# Patient Record
Sex: FEMALE | Race: BLACK OR AFRICAN AMERICAN | NOT HISPANIC OR LATINO
[De-identification: names, ages, dates, MRNs, and addresses within clinical notes are randomized per-mention and may not be internally consistent; named-entity substitution may affect disease eponyms.]

---

## 2017-10-17 ENCOUNTER — APPOINTMENT (OUTPATIENT)
Dept: SURGERY | Facility: CLINIC | Age: 67
End: 2017-10-17
Payer: MEDICARE

## 2017-10-17 VITALS
HEART RATE: 69 BPM | OXYGEN SATURATION: 97 % | BODY MASS INDEX: 26.43 KG/M2 | SYSTOLIC BLOOD PRESSURE: 154 MMHG | TEMPERATURE: 97.6 F | HEIGHT: 61 IN | DIASTOLIC BLOOD PRESSURE: 73 MMHG | WEIGHT: 140 LBS

## 2017-10-17 DIAGNOSIS — Z86.79 PERSONAL HISTORY OF OTHER DISEASES OF THE CIRCULATORY SYSTEM: ICD-10-CM

## 2017-10-17 DIAGNOSIS — Z82.5 FAMILY HISTORY OF ASTHMA AND OTHER CHRONIC LOWER RESPIRATORY DISEASES: ICD-10-CM

## 2017-10-17 DIAGNOSIS — Z85.3 PERSONAL HISTORY OF MALIGNANT NEOPLASM OF BREAST: ICD-10-CM

## 2017-10-17 DIAGNOSIS — Z86.69 PERSONAL HISTORY OF OTHER DISEASES OF THE NERVOUS SYSTEM AND SENSE ORGANS: ICD-10-CM

## 2017-10-17 DIAGNOSIS — Z86.39 PERSONAL HISTORY OF OTHER ENDOCRINE, NUTRITIONAL AND METABOLIC DISEASE: ICD-10-CM

## 2017-10-17 DIAGNOSIS — Z87.39 PERSONAL HISTORY OF OTHER DISEASES OF THE MUSCULOSKELETAL SYSTEM AND CONNECTIVE TISSUE: ICD-10-CM

## 2017-10-17 PROCEDURE — 99203 OFFICE O/P NEW LOW 30 MIN: CPT

## 2017-12-14 ENCOUNTER — LABORATORY RESULT (OUTPATIENT)
Age: 67
End: 2017-12-14

## 2017-12-14 ENCOUNTER — APPOINTMENT (OUTPATIENT)
Dept: PULMONOLOGY | Facility: CLINIC | Age: 67
End: 2017-12-14
Payer: MEDICARE

## 2017-12-14 ENCOUNTER — TRANSCRIPTION ENCOUNTER (OUTPATIENT)
Age: 67
End: 2017-12-14

## 2017-12-14 VITALS
OXYGEN SATURATION: 93 % | TEMPERATURE: 97.5 F | BODY MASS INDEX: 27.19 KG/M2 | HEIGHT: 61 IN | SYSTOLIC BLOOD PRESSURE: 110 MMHG | HEART RATE: 97 BPM | DIASTOLIC BLOOD PRESSURE: 70 MMHG | RESPIRATION RATE: 16 BRPM | WEIGHT: 144 LBS

## 2017-12-14 PROCEDURE — ZZZZZ: CPT

## 2017-12-14 PROCEDURE — 99215 OFFICE O/P EST HI 40 MIN: CPT | Mod: 25

## 2017-12-14 PROCEDURE — 94729 DIFFUSING CAPACITY: CPT

## 2017-12-14 PROCEDURE — 94726 PLETHYSMOGRAPHY LUNG VOLUMES: CPT

## 2017-12-14 PROCEDURE — 94060 EVALUATION OF WHEEZING: CPT

## 2017-12-14 PROCEDURE — 36415 COLL VENOUS BLD VENIPUNCTURE: CPT

## 2017-12-15 LAB
25(OH)D3 SERPL-MCNC: 35.2 NG/ML
ALBUMIN SERPL ELPH-MCNC: 3.9 G/DL
ALP BLD-CCNC: 67 U/L
ALT SERPL-CCNC: 41 U/L
ANION GAP SERPL CALC-SCNC: 17 MMOL/L
AST SERPL-CCNC: 36 U/L
B2 MICROGLOB SERPL-MCNC: 1.6 MG/L
BASOPHILS # BLD AUTO: 0.01 K/UL
BASOPHILS NFR BLD AUTO: 0.2 %
BILIRUB SERPL-MCNC: 0.2 MG/DL
BUN SERPL-MCNC: 17 MG/DL
CALCIUM SERPL-MCNC: 9.6 MG/DL
CALCIUM SERPL-MCNC: 9.6 MG/DL
CARDIOLIPIN AB SER IA-ACNC: NEGATIVE
CCP AB SER IA-ACNC: <8 UNITS
CHLORIDE SERPL-SCNC: 101 MMOL/L
CO2 SERPL-SCNC: 26 MMOL/L
CREAT SERPL-MCNC: 0.89 MG/DL
CRP SERPL-MCNC: <0.2 MG/DL
DEPRECATED KAPPA LC FREE/LAMBDA SER: 1.45 RATIO
ENA SCL70 IGG SER IA-ACNC: <0.2 AL
ENA SS-A AB SER IA-ACNC: <0.2 AL
ENA SS-B AB SER IA-ACNC: <0.2 AL
EOSINOPHIL # BLD AUTO: 0.08 K/UL
EOSINOPHIL NFR BLD AUTO: 1.8 %
ERYTHROCYTE [SEDIMENTATION RATE] IN BLOOD BY WESTERGREN METHOD: 12 MM/HR
FOLATE RBC-MCNC: 1100 NG/ML
GLUCOSE SERPL-MCNC: 109 MG/DL
HCT VFR BLD CALC: 44.3 %
HCT VFR BLD CALC: 45 %
HCYS SERPL-MCNC: 6.7 UMOL/L
HGB BLD-MCNC: 14.3 G/DL
IGA SER QL IEP: 187 MG/DL
IGG SER QL IEP: 1700 MG/DL
IGM SER QL IEP: 48 MG/DL
IMM GRANULOCYTES NFR BLD AUTO: 0 %
KAPPA LC CSF-MCNC: 1.28 MG/DL
KAPPA LC SERPL-MCNC: 1.85 MG/DL
LYMPHOCYTES # BLD AUTO: 1.58 K/UL
LYMPHOCYTES NFR BLD AUTO: 36.4 %
MAN DIFF?: NORMAL
MCHC RBC-ENTMCNC: 29.4 PG
MCHC RBC-ENTMCNC: 32.3 GM/DL
MCV RBC AUTO: 91.2 FL
MONOCYTES # BLD AUTO: 0.28 K/UL
MONOCYTES NFR BLD AUTO: 6.5 %
NEUTROPHILS # BLD AUTO: 2.39 K/UL
NEUTROPHILS NFR BLD AUTO: 55.1 %
NT-PROBNP SERPL-MCNC: 83 PG/ML
PARATHYROID HORMONE INTACT: 25 PG/ML
PHOSPHATE SERPL-MCNC: 2.8 MG/DL
PLATELET # BLD AUTO: 214 K/UL
POTASSIUM SERPL-SCNC: 4 MMOL/L
PROT SERPL-MCNC: 8 G/DL
RBC # BLD: 4.86 M/UL
RBC # FLD: 15 %
RF+CCP IGG SER-IMP: NEGATIVE
RHEUMATOID FACT SER QL: <7 IU/ML
SODIUM SERPL-SCNC: 144 MMOL/L
T3FREE SERPL-MCNC: 2.45 PG/ML
T4 FREE SERPL-MCNC: 1.3 NG/DL
TSH SERPL-ACNC: 0.82 UIU/ML
URATE SERPL-MCNC: 4.5 MG/DL
VIT B12 SERPL-MCNC: 1560 PG/ML
WBC # FLD AUTO: 4.34 K/UL

## 2017-12-18 LAB
ADJUSTED MITOGEN: 8.33 IU/ML
ADJUSTED TB AG: 0 IU/ML
ANACR T: NEGATIVE
M TB IFN-G BLD-IMP: NEGATIVE
MPO AB + PR3 PNL SER: NORMAL
QUANTIFERON GOLD NIL: 0.02 IU/ML
TOTAL IGE SMQN RAST: 86 KU/L

## 2017-12-20 LAB — T4 FREE SERPL DIALY-MCNC: 0.97 NG/DL

## 2017-12-22 LAB — COLLAGEN CTX SERPL-MCNC: 170 PG/ML

## 2018-02-22 ENCOUNTER — OUTPATIENT (OUTPATIENT)
Dept: OUTPATIENT SERVICES | Facility: HOSPITAL | Age: 68
LOS: 1 days | End: 2018-02-22
Payer: COMMERCIAL

## 2018-02-22 ENCOUNTER — APPOINTMENT (OUTPATIENT)
Dept: SLEEP CENTER | Facility: CLINIC | Age: 68
End: 2018-02-22
Payer: MEDICARE

## 2018-02-22 PROCEDURE — G0399: CPT | Mod: 26

## 2018-02-22 PROCEDURE — G0399: CPT

## 2018-02-26 ENCOUNTER — RESULT REVIEW (OUTPATIENT)
Age: 68
End: 2018-02-26

## 2018-02-28 DIAGNOSIS — G47.33 OBSTRUCTIVE SLEEP APNEA (ADULT) (PEDIATRIC): ICD-10-CM

## 2018-03-07 ENCOUNTER — APPOINTMENT (OUTPATIENT)
Dept: THORACIC SURGERY | Facility: CLINIC | Age: 68
End: 2018-03-07

## 2018-03-08 ENCOUNTER — APPOINTMENT (OUTPATIENT)
Dept: PULMONOLOGY | Facility: CLINIC | Age: 68
End: 2018-03-08
Payer: MEDICARE

## 2018-03-08 ENCOUNTER — APPOINTMENT (OUTPATIENT)
Dept: PULMONOLOGY | Facility: CLINIC | Age: 68
End: 2018-03-08

## 2018-03-08 VITALS
RESPIRATION RATE: 18 BRPM | SYSTOLIC BLOOD PRESSURE: 140 MMHG | TEMPERATURE: 97.4 F | DIASTOLIC BLOOD PRESSURE: 90 MMHG | BODY MASS INDEX: 27.19 KG/M2 | OXYGEN SATURATION: 97 % | WEIGHT: 144 LBS | HEIGHT: 61 IN | HEART RATE: 90 BPM

## 2018-03-08 DIAGNOSIS — C50.912 MALIGNANT NEOPLASM OF UNSPECIFIED SITE OF LEFT FEMALE BREAST: ICD-10-CM

## 2018-03-08 PROCEDURE — 36415 COLL VENOUS BLD VENIPUNCTURE: CPT

## 2018-03-08 PROCEDURE — 99215 OFFICE O/P EST HI 40 MIN: CPT | Mod: 25

## 2018-03-09 LAB
CANCER AG125 SERPL-ACNC: 10 U/ML
CEA SERPL-MCNC: 2.2 NG/ML

## 2018-04-09 ENCOUNTER — APPOINTMENT (OUTPATIENT)
Dept: SURGERY | Facility: CLINIC | Age: 68
End: 2018-04-09
Payer: MEDICARE

## 2018-04-09 PROCEDURE — 99214 OFFICE O/P EST MOD 30 MIN: CPT

## 2018-04-18 ENCOUNTER — APPOINTMENT (OUTPATIENT)
Dept: THORACIC SURGERY | Facility: CLINIC | Age: 68
End: 2018-04-18
Payer: MEDICARE

## 2018-04-18 VITALS
RESPIRATION RATE: 15 BRPM | TEMPERATURE: 98.1 F | WEIGHT: 142 LBS | DIASTOLIC BLOOD PRESSURE: 90 MMHG | HEIGHT: 61 IN | HEART RATE: 72 BPM | BODY MASS INDEX: 26.81 KG/M2 | OXYGEN SATURATION: 100 % | SYSTOLIC BLOOD PRESSURE: 152 MMHG

## 2018-04-18 PROCEDURE — 99205 OFFICE O/P NEW HI 60 MIN: CPT

## 2018-11-20 ENCOUNTER — APPOINTMENT (OUTPATIENT)
Dept: PULMONOLOGY | Facility: CLINIC | Age: 68
End: 2018-11-20
Payer: MEDICARE

## 2018-11-20 VITALS
OXYGEN SATURATION: 97 % | BODY MASS INDEX: 26.81 KG/M2 | RESPIRATION RATE: 18 BRPM | TEMPERATURE: 98.5 F | HEART RATE: 73 BPM | DIASTOLIC BLOOD PRESSURE: 85 MMHG | HEIGHT: 61 IN | SYSTOLIC BLOOD PRESSURE: 143 MMHG | WEIGHT: 142 LBS

## 2018-11-20 PROCEDURE — 99215 OFFICE O/P EST HI 40 MIN: CPT

## 2018-12-03 ENCOUNTER — CHART COPY (OUTPATIENT)
Age: 68
End: 2018-12-03

## 2019-03-09 ENCOUNTER — TRANSCRIPTION ENCOUNTER (OUTPATIENT)
Age: 69
End: 2019-03-09

## 2019-03-11 ENCOUNTER — APPOINTMENT (OUTPATIENT)
Dept: PULMONOLOGY | Facility: CLINIC | Age: 69
End: 2019-03-11
Payer: MEDICARE

## 2019-03-11 VITALS
HEIGHT: 61 IN | WEIGHT: 145 LBS | BODY MASS INDEX: 27.38 KG/M2 | HEART RATE: 69 BPM | SYSTOLIC BLOOD PRESSURE: 147 MMHG | RESPIRATION RATE: 16 BRPM | DIASTOLIC BLOOD PRESSURE: 85 MMHG | TEMPERATURE: 98.1 F

## 2019-03-11 DIAGNOSIS — Z00.00 ENCOUNTER FOR GENERAL ADULT MEDICAL EXAMINATION W/OUT ABNORMAL FINDINGS: ICD-10-CM

## 2019-03-11 LAB
BASOPHILS # BLD AUTO: 0.03 K/UL
BASOPHILS NFR BLD AUTO: 0.7 %
EOSINOPHIL # BLD AUTO: 0.21 K/UL
EOSINOPHIL NFR BLD AUTO: 4.8 %
HCT VFR BLD CALC: 45.3 %
HGB BLD-MCNC: 14.2 G/DL
IMM GRANULOCYTES NFR BLD AUTO: 0.2 %
LYMPHOCYTES # BLD AUTO: 1.62 K/UL
LYMPHOCYTES NFR BLD AUTO: 37.4 %
MAN DIFF?: NORMAL
MCHC RBC-ENTMCNC: 28.6 PG
MCHC RBC-ENTMCNC: 31.3 GM/DL
MCV RBC AUTO: 91.3 FL
MONOCYTES # BLD AUTO: 0.34 K/UL
MONOCYTES NFR BLD AUTO: 7.9 %
NEUTROPHILS # BLD AUTO: 2.12 K/UL
NEUTROPHILS NFR BLD AUTO: 49 %
PLATELET # BLD AUTO: 191 K/UL
RBC # BLD: 4.96 M/UL
RBC # FLD: 14.7 %
WBC # FLD AUTO: 4.33 K/UL

## 2019-03-11 PROCEDURE — 36415 COLL VENOUS BLD VENIPUNCTURE: CPT

## 2019-03-11 PROCEDURE — 99214 OFFICE O/P EST MOD 30 MIN: CPT | Mod: 25

## 2019-03-11 NOTE — REVIEW OF SYSTEMS
[As Noted in HPI] : as noted in HPI [Thyroid Problem] : thyroid problem [Negative] : Sleep Disorder [Fever] : no fever [Chills] : no chills [Dry Eyes] : no dryness of the eyes [Eye Irritation] : no ~T irritation of the eyes [Cough] : no cough [Sputum] : not coughing up ~M sputum [Hay Fever] : no hay fever [Itchy Eyes] : no itching of ~T the eyes [Nocturia] : no nocturia [Frequency] : no change in urinary frequency [Trauma] : no ~T physical trauma [Fracture] : no fracture [Raynaud] : no Raynaud's phenomenon was observed [Scleroderma] : no scleroderma [Headache] : no headache [Dizziness] : no dizziness [Depression] : no depression [Difficulty Initiating Sleep] : no difficulty falling asleep [Difficulty Maintaining Sleep] : no difficulty maintaining sleep [de-identified] : thyroid nodule

## 2019-03-11 NOTE — PHYSICAL EXAM
[General Appearance - Well Developed] : well developed [General Appearance - Well Nourished] : well nourished [Normal Conjunctiva] : the conjunctiva exhibited no abnormalities [II] : II [Jugular Venous Distention Increased] : there was no jugular-venous distention [Heart Rate And Rhythm] : heart rate and rhythm were normal [Heart Sounds] : normal S1 and S2 [] : no respiratory distress [Respiration, Rhythm And Depth] : normal respiratory rhythm and effort [Abdomen Soft] : soft [Abnormal Walk] : normal gait [Cyanosis, Localized] : no localized cyanosis [Non-Pitting] : non-pitting [Skin Turgor] : normal skin turgor [Cranial Nerves] : cranial nerves 2-12 were intact [Oriented To Time, Place, And Person] : oriented to person, place, and time [Skin Color & Pigmentation] : normal skin color and pigmentation [FreeTextEntry1] : no spine tenderness

## 2019-03-11 NOTE — CONSULT LETTER
[Dear  ___] : Dear  [unfilled], [Courtesy Letter:] : I had the pleasure of seeing your patient, [unfilled], in my office today. [Sincerely,] : Sincerely, [DrAnushka  ___] : Dr. LOWRY [Kristen Auguste MD, FCCP] : Kristen Auguste MD, FCCP [Director, Pulmonary Hypertension Program] : Director, Pulmonary Hypertension Program [[facility]] : [facility] [Division of Pulmonary, Critical Care and Sleep Medicine] : Division of Pulmonary, Critical Care and Sleep Medicine [Associate Professor of Medicine] : Associate Professor of Medicine

## 2019-03-11 NOTE — HISTORY OF PRESENT ILLNESS
[FreeTextEntry1] : This letter  is regarding your patient  who  attended pulmonary out patient office today.  I have reviewed  patient's  past history, social history, family history and medication list. I also  reviewed nurse practitioners/ and fellows  notes and assessment and agree with it.  Ã¢â‚¬â€œThe patient was referred by --- abnormal ct scan chest\par --------------PAST H/O BREAST CA ---LEFT LUMPECTOMY AND  S/P RT [2001]-----RECEIVED  BCG IN PAST ----PAST H/O QUANTIFERON TEST POSITIVE--------------No history of , fever, chills , rigors, chestpain, or hemoptysis. Questionable history of Raynauds phenomenon. No h/o significant weight loss in last few months. No history of liver dysfunction , collagen vascular disorder or chronic thromboembolic disease. I would classify her dyspnea as WHO  FUNCTIONAL CLASS II--------\par ----Echo  date------NOT AVAILABLE\par ----Pft date---------SPIROMETRY, LUNG VOLUMES ARE NORMAL , DECREASED  DLCO\par ----Ct scan date-------[MARISSA]---OCT 2107---5 mm ground glass density in the anterior basilar segment of left lower lobe------ patient also has a left lobe thyroid nodule\par ---CT FEB 2108---- LEFT LOWER LOBE  NODULE  UNCHANGED\par --\par --nov 2018- -patient otherwise asymptomatic -had repeat ct at [facility] radiology\par --  MARCH 2019-  ASYMPTOMATIC

## 2019-03-11 NOTE — DISCUSSION/SUMMARY
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  Patient at this time  will follow  the above mentioned recommendations and return back for follow up visit. If you have any questions  I can be reached  at [phone]  or  [phone].  \par

## 2019-03-12 LAB
ALBUMIN SERPL ELPH-MCNC: 4.1 G/DL
ALP BLD-CCNC: 76 U/L
ALT SERPL-CCNC: 34 U/L
ANION GAP SERPL CALC-SCNC: 11 MMOL/L
AST SERPL-CCNC: 25 U/L
BILIRUB SERPL-MCNC: 0.4 MG/DL
BUN SERPL-MCNC: 17 MG/DL
CALCIUM SERPL-MCNC: 9.9 MG/DL
CHLORIDE SERPL-SCNC: 103 MMOL/L
CO2 SERPL-SCNC: 30 MMOL/L
CREAT SERPL-MCNC: 1 MG/DL
GLUCOSE SERPL-MCNC: 113 MG/DL
NT-PROBNP SERPL-MCNC: 55 PG/ML
POTASSIUM SERPL-SCNC: 4.3 MMOL/L
PROT SERPL-MCNC: 6.8 G/DL
SODIUM SERPL-SCNC: 144 MMOL/L
TSH SERPL-ACNC: 0.66 UIU/ML

## 2019-04-12 ENCOUNTER — MOBILE ON CALL (OUTPATIENT)
Age: 69
End: 2019-04-12

## 2019-04-17 ENCOUNTER — APPOINTMENT (OUTPATIENT)
Dept: THORACIC SURGERY | Facility: CLINIC | Age: 69
End: 2019-04-17

## 2019-05-08 ENCOUNTER — APPOINTMENT (OUTPATIENT)
Dept: THORACIC SURGERY | Facility: CLINIC | Age: 69
End: 2019-05-08
Payer: MEDICARE

## 2019-05-08 VITALS
OXYGEN SATURATION: 98 % | BODY MASS INDEX: 26.83 KG/M2 | WEIGHT: 142 LBS | DIASTOLIC BLOOD PRESSURE: 82 MMHG | SYSTOLIC BLOOD PRESSURE: 156 MMHG | TEMPERATURE: 98 F | HEART RATE: 70 BPM | RESPIRATION RATE: 18 BRPM

## 2019-05-08 PROCEDURE — 99213 OFFICE O/P EST LOW 20 MIN: CPT

## 2019-05-08 RX ORDER — CALCIUM CARBONATE/VITAMIN D3 600 MG-20
TABLET ORAL
Refills: 0 | Status: ACTIVE | COMMUNITY

## 2019-05-08 RX ORDER — FLUTICASONE PROPIONATE 50 UG/1
50 SPRAY, METERED NASAL
Refills: 0 | Status: ACTIVE | COMMUNITY

## 2019-05-08 RX ORDER — VALSARTAN AND HYDROCHLOROTHIAZIDE 160; 12.5 MG/1; MG/1
160-12.5 TABLET, FILM COATED ORAL
Refills: 0 | Status: ACTIVE | COMMUNITY

## 2019-05-08 RX ORDER — ATORVASTATIN CALCIUM 10 MG/1
10 TABLET, FILM COATED ORAL EVERY OTHER DAY
Refills: 0 | Status: ACTIVE | COMMUNITY

## 2019-05-08 RX ORDER — ALBUTEROL SULFATE 90 UG/1
INHALANT RESPIRATORY (INHALATION)
Refills: 0 | Status: ACTIVE | COMMUNITY

## 2019-05-08 RX ORDER — PILOCARPINE HYDROCHLORIDE OPHTHALMIC SOLUTION 10 MG/ML
1 SOLUTION/ DROPS OPHTHALMIC
Refills: 0 | Status: DISCONTINUED | COMMUNITY

## 2019-05-10 NOTE — CONSULT LETTER
[Dear  ___] : Dear  [unfilled], [Courtesy Letter:] : I had the pleasure of seeing your patient, [unfilled], in my office today. [Please see my note below.] : Please see my note below. [Sincerely,] : Sincerely, [FreeTextEntry2] : Dr. Kristen Auguste (Pulm/Ref) [FreeTextEntry3] : \par \par \par Misael Moody MD, FACS \par , Division of Thoracic Surgery \par [facility] \par Chief, Thoracic Surgery \par [facility] \par Department of Cardiovascular & Thoracic Surgery \par  \par [facility] [employer] [facility]

## 2019-05-10 NOTE — HISTORY OF PRESENT ILLNESS
[FreeTextEntry1] : 70 y/o female with history of a lung nodule returns today for followup.  She was referred by Dr. Auguste in April 2018 for evaluation of a groundglass lung nodule.  \par \par PMHx includes breast cancer s/p left lumpectomy and radiation therapy (2002). She also has received BCG in the past and has tested positive Quantiferon.  She also has a left thyroid nodule, arthritis, hypertension, Type 2 diabetes mild VALORIE and migraine headaches. \par \par 4/10/19 Chest CT Scan revealed a 5 x 3 mm slightly irregular solid pulmonary nodule in the LLL (with tiny surrounding normal vessels) and a 3 x 2 mm ovoid subpleural solid nodule in the right lung apex posteriorly (series 7, image 44).  Mild biapical pleural scaring.  Enlarged thyroid gland with nodules. \par \par CT Chest 2/08/2018 Persistent unchanged left lower lobe groundglass nodule. Left thyroid nodules have solid and cystic components. Bilateral axillary skin thickening greater on the left than the right with a nodular subcutaneous component on the left. \par  \par Today, she denies SOB, cough, chest pain, hemoptysis, palpitation, fever, recent illness, hospitalization and significant weight loss.\par

## 2019-05-10 NOTE — DATA REVIEWED
[FreeTextEntry1] : 4/10/19 Chest CT Scan revealed a 5 x 3 mm slightly irregular solid pulmonary nodule in the LLL (with tiny surrounding normal vessels) and a 3 x 2 mm ovoid subpleural solid nodule in the right lung apex posteriorly (series 7, image 44).  Mild biapical pleural scaring.  Enlarged thyroid gland with nodules. \par

## 2019-05-10 NOTE — ASSESSMENT
[FreeTextEntry1] : 70 y/o female with history of a lung nodule returns today for followup.  She was referred by Dr. Auguste in April 2018 for evaluation of a groundglass lung nodule.  \par \par 4/10/19 Chest CT Scan revealed a 5 x 3 mm slightly irregular solid pulmonary nodule in the LLL (with tiny surrounding normal vessels) and a 3 x 2 mm ovoid subpleural solid nodule in the right lung apex posteriorly (series 7, image 44).  Mild biapical pleural scaring.  Enlarged thyroid gland with nodules. \par \par I have reviewed the patient's medical records and diagnostic images during the time of this office visit, and I have made the following recommendation: \par 1. Stable lung nodules. \par 2. RTC in 1 year with CT chest w/o contrast. \par \par \par \par Written by Rosa Elena Hinojosa NP, acting as a scribe for Dr. Misael Moody.       \par “The documentation recorded by the scribe accurately reflects the service I personally performed and the decisions made by me.”   Misael Moody MD.\par \par \par

## 2019-05-10 NOTE — PHYSICAL EXAM
[Sclera] : the sclera and conjunctiva were normal [PERRL With Normal Accommodation] : pupils were equal in size, round, and reactive to light [Neck Appearance] : the appearance of the neck was normal [Auscultation Breath Sounds / Voice Sounds] : lungs were clear to auscultation bilaterally [Heart Rate And Rhythm] : heart rate was normal and rhythm regular [Respiration, Rhythm And Depth] : normal respiratory rhythm and effort [Heart Sounds] : normal S1 and S2 [Examination Of The Chest] : the chest was normal in appearance [2+] : left 2+ [No Pulse Delay] : no pulse delay [Bowel Sounds] : normal bowel sounds [Abdomen Soft] : soft [No CVA Tenderness] : no ~M costovertebral angle tenderness [Cervical Lymph Nodes Enlarged Anterior Bilaterally] : anterior cervical [Cervical Lymph Nodes Enlarged Posterior Bilaterally] : posterior cervical [Involuntary Movements] : no involuntary movements were seen [Abnormal Walk] : normal gait [Skin Color & Pigmentation] : normal skin color and pigmentation [] : no rash [Skin Turgor] : normal skin turgor [No Focal Deficits] : no focal deficits [Oriented To Time, Place, And Person] : oriented to person, place, and time [Mood] : the mood was normal [Affect] : the affect was normal [FreeTextEntry1] : deferred

## 2019-07-01 ENCOUNTER — MOBILE ON CALL (OUTPATIENT)
Age: 69
End: 2019-07-01

## 2019-10-10 NOTE — CONSULT LETTER
[Dear  ___] : Dear  [unfilled], [Courtesy Letter:] : I had the pleasure of seeing your patient, [unfilled], in my office today. [Sincerely,] : Sincerely, [DrAnushka  ___] : Dr. LOWRY [Kristen Auguste MD, FCCP] : Kristen Auguste MD, FCCP [Director, Pulmonary Hypertension Program] : Director, Pulmonary Hypertension Program [[facility]] : [facility] [Division of Pulmonary, Critical Care and Sleep Medicine] : Division of Pulmonary, Critical Care and Sleep Medicine [Associate Professor of Medicine] : Associate Professor of Medicine

## 2019-10-11 ENCOUNTER — APPOINTMENT (OUTPATIENT)
Dept: PULMONOLOGY | Facility: CLINIC | Age: 69
End: 2019-10-11
Payer: MEDICARE

## 2019-10-11 VITALS — WEIGHT: 148 LBS | HEIGHT: 61 IN | HEART RATE: 88 BPM | OXYGEN SATURATION: 97 % | BODY MASS INDEX: 27.94 KG/M2

## 2019-10-11 VITALS
DIASTOLIC BLOOD PRESSURE: 88 MMHG | HEART RATE: 75 BPM | TEMPERATURE: 97.6 F | SYSTOLIC BLOOD PRESSURE: 162 MMHG | OXYGEN SATURATION: 95 % | RESPIRATION RATE: 16 BRPM | WEIGHT: 143.25 LBS | BODY MASS INDEX: 27.07 KG/M2

## 2019-10-11 DIAGNOSIS — R06.83 SNORING: ICD-10-CM

## 2019-10-11 PROCEDURE — 94060 EVALUATION OF WHEEZING: CPT

## 2019-10-11 PROCEDURE — 94729 DIFFUSING CAPACITY: CPT

## 2019-10-11 PROCEDURE — ZZZZZ: CPT

## 2019-10-11 PROCEDURE — 94726 PLETHYSMOGRAPHY LUNG VOLUMES: CPT

## 2019-10-11 PROCEDURE — 99215 OFFICE O/P EST HI 40 MIN: CPT | Mod: 25

## 2019-10-11 NOTE — DISCUSSION/SUMMARY
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  Patient at this time  will follow  the above mentioned recommendations and return back for follow up visit. If you have any questions  I can be reached  at [phone]  or  [phone].  \par \par Kristen Auguste MD, [facility] \par Director, Pulmonary Hypertension Program \par [facility] \par Division of Pulmonary, Critical Care and Sleep Medicine \par  Professor of Medicine \par [employer]\par

## 2019-10-11 NOTE — REVIEW OF SYSTEMS
[As Noted in HPI] : as noted in HPI [Thyroid Problem] : thyroid problem [Negative] : Sleep Disorder [Fever] : no fever [Chills] : no chills [Dry Eyes] : no dryness of the eyes [Eye Irritation] : no ~T irritation of the eyes [Cough] : no cough [Sputum] : not coughing up ~M sputum [Hay Fever] : no hay fever [Itchy Eyes] : no itching of ~T the eyes [Nocturia] : no nocturia [Frequency] : no change in urinary frequency [Fracture] : no fracture [Trauma] : no ~T physical trauma [Raynaud] : no Raynaud's phenomenon was observed [Headache] : no headache [Scleroderma] : no scleroderma [Difficulty Initiating Sleep] : no difficulty falling asleep [Depression] : no depression [Dizziness] : no dizziness [de-identified] : thyroid nodule [Difficulty Maintaining Sleep] : no difficulty maintaining sleep

## 2019-10-11 NOTE — PHYSICAL EXAM
[General Appearance - Well Developed] : well developed [General Appearance - Well Nourished] : well nourished [Normal Conjunctiva] : the conjunctiva exhibited no abnormalities [II] : II [Jugular Venous Distention Increased] : there was no jugular-venous distention [Heart Rate And Rhythm] : heart rate and rhythm were normal [Heart Sounds] : normal S1 and S2 [] : no respiratory distress [Respiration, Rhythm And Depth] : normal respiratory rhythm and effort [Abdomen Soft] : soft [Abnormal Walk] : normal gait [Cyanosis, Localized] : no localized cyanosis [Non-Pitting] : non-pitting [Skin Turgor] : normal skin turgor [Cranial Nerves] : cranial nerves 2-12 were intact [Oriented To Time, Place, And Person] : oriented to person, place, and time [Skin Color & Pigmentation] : normal skin color and pigmentation [Auscultation Breath Sounds / Voice Sounds] : lungs were clear to auscultation bilaterally [FreeTextEntry1] : no spine tenderness

## 2019-10-11 NOTE — HISTORY OF PRESENT ILLNESS
[FreeTextEntry1] : This letter  is regarding your patient  who  attended pulmonary out patient office today.  I have reviewed  patient's  past history, social history, family history and medication list. I also  reviewed nurse practitioners/ and fellows  notes and assessment and agree with it. The patient was referred by --- abnormal ct scan chest\par --------------PAST H/O BREAST CA ---LEFT LUMPECTOMY AND  S/P RT [2001]-----RECEIVED  BCG IN PAST ----PAST H/O QUANTIFERON TEST ? POSITIVE--------------No history of , fever, chills , rigors, chestpain, or hemoptysis. Questionable history of Raynauds phenomenon. No h/o significant weight loss in last few months. No history of liver dysfunction , collagen vascular disorder or chronic thromboembolic disease. I would classify her dyspnea as WHO  FUNCTIONAL CLASS II--------\par ----Echo  date------NOT AVAILABLE\par ----Pft date---------SPIROMETRY, LUNG VOLUMES ARE NORMAL , DECREASED  DLCO\par \par pft 10/2019 normal lung volumes, decreased dlco\par \par ----Ct scan date-------[MARISSA]---OCT 2107---5 mm ground glass density in the anterior basilar segment of left lower lobe------ patient also has a left lobe thyroid nodule\par ---CT FEB 2108---- LEFT LOWER LOBE  NODULE  UNCHANGED\par \par ct chest 4/2019 4/10/19 Chest CT Scan revealed a 5 x 3 mm slightly irregular solid pulmonary nodule in the LLL (with tiny surrounding normal vessels) and a 3 x 2 mm ovoid subpleural solid nodule in the right lung apex posteriorly (series 7, image 44). Mild biapical pleural scaring. Enlarged thyroid gland with nodules.\par \par oct 2019 no resp complaints- here for f/u visit, up to date w/influenza vac

## 2019-10-29 ENCOUNTER — OUTPATIENT (OUTPATIENT)
Dept: OUTPATIENT SERVICES | Facility: HOSPITAL | Age: 69
LOS: 1 days | End: 2019-10-29

## 2019-10-29 ENCOUNTER — APPOINTMENT (OUTPATIENT)
Dept: CV DIAGNOSITCS | Facility: HOSPITAL | Age: 69
End: 2019-10-29
Payer: MEDICARE

## 2019-10-29 DIAGNOSIS — R05 COUGH: ICD-10-CM

## 2019-10-29 PROCEDURE — 93306 TTE W/DOPPLER COMPLETE: CPT | Mod: 26

## 2020-05-01 ENCOUNTER — APPOINTMENT (OUTPATIENT)
Dept: PULMONOLOGY | Facility: CLINIC | Age: 70
End: 2020-05-01
Payer: MEDICARE

## 2020-05-01 PROCEDURE — 99443: CPT

## 2020-05-01 NOTE — HISTORY OF PRESENT ILLNESS
[Verbal consent obtained from patient] : the patient, [unfilled] [TextBox_4] : 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

## 2020-05-01 NOTE — DISCUSSION/SUMMARY
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  Patient at this time  will follow  the above mentioned recommendations and return back for follow up visit. If you have any questions  I can be reached  at #[phone] (beeper)  or  [phone] (office).\par \par \par Kristen Auguste MD, [facility] \par Director, Pulmonary Hypertension Program \par [facility] \par Division of Pulmonary, Critical Care and Sleep Medicine \par  Professor of Medicine \par [employer] [facility] [employer]\par

## 2020-05-06 ENCOUNTER — APPOINTMENT (OUTPATIENT)
Dept: THORACIC SURGERY | Facility: CLINIC | Age: 70
End: 2020-05-06

## 2020-05-08 ENCOUNTER — NON-APPOINTMENT (OUTPATIENT)
Age: 70
End: 2020-05-08

## 2020-06-09 ENCOUNTER — APPOINTMENT (OUTPATIENT)
Dept: PULMONOLOGY | Facility: CLINIC | Age: 70
End: 2020-06-09
Payer: MEDICARE

## 2020-06-09 PROCEDURE — 36415 COLL VENOUS BLD VENIPUNCTURE: CPT

## 2020-07-01 ENCOUNTER — APPOINTMENT (OUTPATIENT)
Dept: THORACIC SURGERY | Facility: CLINIC | Age: 70
End: 2020-07-01
Payer: MEDICARE

## 2020-07-01 PROCEDURE — 99201 OFFICE OUTPATIENT NEW 10 MINUTES: CPT | Mod: 95

## 2020-07-06 NOTE — REASON FOR VISIT
[Home] : at home, [unfilled] , at the time of the visit. [Medical Office: ([facility])___] : at the medical office located in  [Verbal consent obtained from patient] : the patient, [unfilled] [Follow-Up: _____] : a [unfilled] follow-up visit [FreeTextEntry4] : Madhuri Trotter, NP

## 2020-07-06 NOTE — HISTORY OF PRESENT ILLNESS
[FreeTextEntry1] : 71 y/o female with history of a lung nodule returns today for followup. She was referred by Dr. Auguste in April 2018 for evaluation of a groundglass lung nodule. \par \par PMHx includes breast cancer s/p left lumpectomy and radiation therapy (2002). She also has received BCG in the past and has tested positive Quantiferon. She also has a left thyroid nodule, arthritis, hypertension, Type 2 diabetes mild VALORIE and migraine headaches. \par \par CT Chest 2/08/2018: Persistent unchanged left lower lobe groundglass nodule. Left thyroid nodules have solid and cystic components. Bilateral axillary skin thickening greater on the left than the right with a nodular subcutaneous component on the left. \par \par Chest CT Scan 4/10/19: A 5 x 3 mm slightly irregular solid pulmonary nodule in the LLL (with tiny surrounding normal vessels) and a 3 x 2 mm ovoid subpleural solid nodule in the right lung apex posteriorly (series 7, image 44). Mild biapical pleural scaring. Enlarged thyroid gland with nodules. \par \par CT chest scan 6/8/2020: Stable LLL 7 x 7x 12 mm subsegmental and nodular opacity is unchanged. RUL apical subpleural 4 mm nodule unchanged. \par \par The patient denies shortness of breath, recent URI, fever, chills, dysphagia or hemoptysis.

## 2020-11-09 ENCOUNTER — APPOINTMENT (OUTPATIENT)
Dept: PULMONOLOGY | Facility: CLINIC | Age: 70
End: 2020-11-09
Payer: MEDICARE

## 2020-11-09 VITALS
BODY MASS INDEX: 27.38 KG/M2 | WEIGHT: 145 LBS | HEIGHT: 61 IN | SYSTOLIC BLOOD PRESSURE: 146 MMHG | DIASTOLIC BLOOD PRESSURE: 85 MMHG | TEMPERATURE: 98 F | RESPIRATION RATE: 16 BRPM | HEART RATE: 84 BPM

## 2020-11-09 PROCEDURE — 99072 ADDL SUPL MATRL&STAF TM PHE: CPT

## 2020-11-09 PROCEDURE — 99215 OFFICE O/P EST HI 40 MIN: CPT

## 2020-11-09 NOTE — HISTORY OF PRESENT ILLNESS
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

## 2020-11-09 NOTE — PHYSICAL EXAM
[No Acute Distress] : no acute distress [Normal Oropharynx] : normal oropharynx [III] : Mallampati Class: III [No Neck Mass] : no neck mass [No Murmurs] : no murmurs [No Resp Distress] : no resp distress [No Abnormalities] : no abnormalities [Benign] : benign [Normal Gait] : normal gait [No Edema] : no edema [Normal Color/ Pigmentation] : normal color/ pigmentation [Oriented x3] : oriented x3

## 2020-11-09 NOTE — DISCUSSION/SUMMARY
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  Patient at this time  will follow  the above mentioned recommendations and return back for follow up visit. If you have any questions  I can be reached  at #[phone] (beeper)  or  [phone] (office).\par \par \par Kristen Auguste MD, FCCP \par Director, Pulmonary Hypertension Program \par [facility] \par Division of Pulmonary, Critical Care and Sleep Medicine \par  Professor of Medicine \par [employer]\par

## 2021-03-11 ENCOUNTER — APPOINTMENT (OUTPATIENT)
Dept: PULMONOLOGY | Facility: CLINIC | Age: 71
End: 2021-03-11
Payer: MEDICARE

## 2021-03-11 VITALS
SYSTOLIC BLOOD PRESSURE: 171 MMHG | HEIGHT: 61 IN | DIASTOLIC BLOOD PRESSURE: 92 MMHG | TEMPERATURE: 97.8 F | WEIGHT: 145 LBS | BODY MASS INDEX: 27.38 KG/M2 | HEART RATE: 86 BPM | OXYGEN SATURATION: 97 %

## 2021-03-11 PROCEDURE — 99072 ADDL SUPL MATRL&STAF TM PHE: CPT

## 2021-03-11 PROCEDURE — 99215 OFFICE O/P EST HI 40 MIN: CPT | Mod: 25

## 2021-03-11 PROCEDURE — 36415 COLL VENOUS BLD VENIPUNCTURE: CPT

## 2021-03-11 NOTE — HISTORY OF PRESENT ILLNESS
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

## 2021-03-11 NOTE — DISCUSSION/SUMMARY
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  Patient at this time  will follow  the above mentioned recommendations and return back for follow up visit. If you have any questions  I can be reached  at #[phone] (beeper)  or  [phone] (office).\par \par VANI Swann-ROSETTA\par \par Kristen Auguste MD, [facility] \par Director, Pulmonary Hypertension Program \par [facility] \par Division of Pulmonary, Critical Care and Sleep Medicine \par  Professor of Medicine \par [employer] [facility] [employer]\par

## 2021-03-12 LAB
25(OH)D3 SERPL-MCNC: 41.1 NG/ML
ALBUMIN SERPL ELPH-MCNC: 4.1 G/DL
ALP BLD-CCNC: 80 U/L
ALT SERPL-CCNC: 26 U/L
ANION GAP SERPL CALC-SCNC: 13 MMOL/L
AST SERPL-CCNC: 27 U/L
BASOPHILS # BLD AUTO: 0.02 K/UL
BASOPHILS NFR BLD AUTO: 0.5 %
BILIRUB SERPL-MCNC: 0.2 MG/DL
BUN SERPL-MCNC: 17 MG/DL
CALCIUM SERPL-MCNC: 10 MG/DL
CHLORIDE SERPL-SCNC: 102 MMOL/L
CO2 SERPL-SCNC: 26 MMOL/L
CREAT SERPL-MCNC: 0.99 MG/DL
EOSINOPHIL # BLD AUTO: 0.18 K/UL
EOSINOPHIL NFR BLD AUTO: 4.5 %
ESTIMATED AVERAGE GLUCOSE: 128 MG/DL
FERRITIN SERPL-MCNC: 245 NG/ML
GLUCOSE SERPL-MCNC: 98 MG/DL
HBA1C MFR BLD HPLC: 6.1 %
HCT VFR BLD CALC: 42.3 %
HGB BLD-MCNC: 14 G/DL
IMM GRANULOCYTES NFR BLD AUTO: 0 %
LYMPHOCYTES # BLD AUTO: 1.63 K/UL
LYMPHOCYTES NFR BLD AUTO: 40.8 %
MAN DIFF?: NORMAL
MCHC RBC-ENTMCNC: 29.1 PG
MCHC RBC-ENTMCNC: 33.1 GM/DL
MCV RBC AUTO: 87.9 FL
MONOCYTES # BLD AUTO: 0.43 K/UL
MONOCYTES NFR BLD AUTO: 10.8 %
NEUTROPHILS # BLD AUTO: 1.74 K/UL
NEUTROPHILS NFR BLD AUTO: 43.4 %
PLATELET # BLD AUTO: 196 K/UL
POTASSIUM SERPL-SCNC: 4.5 MMOL/L
PROT SERPL-MCNC: 7.1 G/DL
RBC # BLD: 4.81 M/UL
RBC # FLD: 14.9 %
SARS-COV-2 IGG SERPL IA-ACNC: 0.12 INDEX
SARS-COV-2 IGG SERPL QL IA: NEGATIVE
SODIUM SERPL-SCNC: 141 MMOL/L
TSH SERPL-ACNC: 0.49 UIU/ML
WBC # FLD AUTO: 4 K/UL

## 2021-03-16 LAB — TOTAL IGE SMQN RAST: 42 KU/L

## 2021-04-12 ENCOUNTER — FORM ENCOUNTER (OUTPATIENT)
Age: 71
End: 2021-04-12

## 2021-04-13 ENCOUNTER — OUTPATIENT (OUTPATIENT)
Dept: OUTPATIENT SERVICES | Facility: HOSPITAL | Age: 71
LOS: 1 days | End: 2021-04-13
Payer: COMMERCIAL

## 2021-04-13 ENCOUNTER — APPOINTMENT (OUTPATIENT)
Dept: SLEEP CENTER | Facility: CLINIC | Age: 71
End: 2021-04-13
Payer: MEDICARE

## 2021-04-13 PROCEDURE — 95806 SLEEP STUDY UNATT&RESP EFFT: CPT | Mod: 26

## 2021-04-13 PROCEDURE — 95806 SLEEP STUDY UNATT&RESP EFFT: CPT

## 2021-04-14 ENCOUNTER — APPOINTMENT (OUTPATIENT)
Dept: THORACIC SURGERY | Facility: CLINIC | Age: 71
End: 2021-04-14

## 2021-04-14 ENCOUNTER — APPOINTMENT (OUTPATIENT)
Dept: THORACIC SURGERY | Facility: CLINIC | Age: 71
End: 2021-04-14
Payer: MEDICARE

## 2021-04-14 PROCEDURE — 99443: CPT

## 2021-04-16 NOTE — CONSULT LETTER
[FreeTextEntry2] : Dr. Kristen Auguste (Pulm/Ref)  [FreeTextEntry3] : Misael Moody MD, FACS \par , Division of Thoracic Surgery \par [facility] \par Chief, Thoracic Surgery \par [facility] \par Department of Cardiovascular & Thoracic Surgery \par  \par [facility] [employer] [facility]\par

## 2021-04-16 NOTE — ASSESSMENT
[FreeTextEntry1] : Ms. DAREK VALENCIA, 71 year old female w/ PMHx of breast cancer (s/p left lumpectomy and radiation therapy (2002); Positive Quantiferon (has received BCG in the past) left thyroid nodule; hypertension; Type 2 diabetes. \par \par Initially presented to office in 2018 as a referral from Dr. Auguste for evaluation of LLL nodule. She is being followed with surveillance CT scans. \par \par I have independently reviewed the medical records and imaging at the time of this consultation. CT scan stable. Recommended patient to RTC in 18 months with follow up CT chest, to re-evaluate stability.\par Recommendations reviewed with patient during this office visit, and all questions answered; Patient instructed on the importance of follow up and verbalizes understanding.\par \par I have spent 25 minutes on the phone discussing above result and plan of care with patient.\par \par I personally performed the services described in the documentation, reviewed the documentation recorded by the scribe in my presence and it accurately and completely records my words and actions.\par \par I, Keila Calvillo ANP-C, am scribing for and the presence of DIXON Beauchamp, the following sections HISTORY OF PRESENT ILLNESS, PAST MEDICAL/FAMILY/SOCIAL HISTORY; REVIEW OF SYSTEMS; VITAL SIGNS; PHYSICAL EXAM; DISPOSITION.\par \par

## 2021-04-16 NOTE — HISTORY OF PRESENT ILLNESS
[FreeTextEntry1] : Ms. DAREK VALENCIA, 71 year old female w/ PMHx of breast cancer (s/p left lumpectomy and radiation therapy (2002); Positive Quantiferon (has received BCG in the past) left thyroid nodule; hypertension; Type 2 diabetes. \par \par Initially presented to office in 2018 as a referral from Dr. Auguste for evaluation of LLL nodule. She is being followed with surveillance CT scans. \par \par \par CT Chest 2/08/2018: Persistent unchanged left lower lobe groundglass nodule. Left thyroid nodules have solid and cystic components. Bilateral axillary skin thickening greater on the left than the right with a nodular subcutaneous component on the left. \par \par Chest CT Scan 4/10/19: A 5 x 3 mm slightly irregular solid pulmonary nodule in the LLL (with tiny surrounding normal vessels) and a 3 x 2 mm ovoid subpleural solid nodule in the right lung apex posteriorly (series 7, image 44). Mild biapical pleural scaring. Enlarged thyroid gland with nodules. \par \par CT chest scan 6/8/2020: Stable LLL 7 x 7x 12 mm subsegmental and nodular opacity is unchanged. RUL apical subpleural 4 mm nodule unchanged. \par \par CT Chest on 3/8/2021:\par - Stable LLL 7 x 6 mm gg nodule (5:206)\par - Enlarged thyroid and multinodular\par \par Patient is followed today via Telephonic visit. Denies worsening SOB, chest pain, cough, hemoptysis, fever, chills, night sweats, lightheadedness or dizziness.\par \par

## 2021-04-20 DIAGNOSIS — G47.33 OBSTRUCTIVE SLEEP APNEA (ADULT) (PEDIATRIC): ICD-10-CM

## 2021-06-18 ENCOUNTER — OUTPATIENT (OUTPATIENT)
Dept: OUTPATIENT SERVICES | Facility: HOSPITAL | Age: 71
LOS: 1 days | End: 2021-06-18
Payer: COMMERCIAL

## 2021-06-18 ENCOUNTER — APPOINTMENT (OUTPATIENT)
Dept: SLEEP CENTER | Facility: CLINIC | Age: 71
End: 2021-06-18
Payer: MEDICARE

## 2021-06-18 PROCEDURE — 95811 POLYSOM 6/>YRS CPAP 4/> PARM: CPT | Mod: 26

## 2021-06-18 PROCEDURE — 95811 POLYSOM 6/>YRS CPAP 4/> PARM: CPT

## 2021-06-21 DIAGNOSIS — G47.33 OBSTRUCTIVE SLEEP APNEA (ADULT) (PEDIATRIC): ICD-10-CM

## 2021-07-12 ENCOUNTER — APPOINTMENT (OUTPATIENT)
Dept: PULMONOLOGY | Facility: CLINIC | Age: 71
End: 2021-07-12
Payer: MEDICARE

## 2021-07-12 VITALS
HEIGHT: 60 IN | TEMPERATURE: 97 F | WEIGHT: 146 LBS | OXYGEN SATURATION: 98 % | BODY MASS INDEX: 28.66 KG/M2 | DIASTOLIC BLOOD PRESSURE: 82 MMHG | RESPIRATION RATE: 15 BRPM | HEART RATE: 59 BPM | SYSTOLIC BLOOD PRESSURE: 169 MMHG

## 2021-07-12 VITALS
HEART RATE: 61 BPM | WEIGHT: 146 LBS | HEIGHT: 60.2 IN | OXYGEN SATURATION: 98 % | BODY MASS INDEX: 28.29 KG/M2 | TEMPERATURE: 98 F

## 2021-07-12 LAB
BASOPHILS # BLD AUTO: 0.03 K/UL
BASOPHILS NFR BLD AUTO: 0.8 %
COVID-19 SPIKE DOMAIN ANTIBODY INTERPRETATION: POSITIVE
EOSINOPHIL # BLD AUTO: 0.15 K/UL
EOSINOPHIL NFR BLD AUTO: 3.8 %
HCT VFR BLD CALC: 43.7 %
HGB BLD-MCNC: 14.2 G/DL
IMM GRANULOCYTES NFR BLD AUTO: 0 %
LYMPHOCYTES # BLD AUTO: 1.47 K/UL
LYMPHOCYTES NFR BLD AUTO: 37 %
MAN DIFF?: NORMAL
MCHC RBC-ENTMCNC: 28.9 PG
MCHC RBC-ENTMCNC: 32.5 GM/DL
MCV RBC AUTO: 89 FL
MONOCYTES # BLD AUTO: 0.27 K/UL
MONOCYTES NFR BLD AUTO: 6.8 %
NEUTROPHILS # BLD AUTO: 2.05 K/UL
NEUTROPHILS NFR BLD AUTO: 51.6 %
PLATELET # BLD AUTO: 193 K/UL
RBC # BLD: 4.91 M/UL
RBC # FLD: 15 %
SARS-COV-2 AB SERPL IA-ACNC: >250 U/ML
WBC # FLD AUTO: 3.97 K/UL

## 2021-07-12 PROCEDURE — 99215 OFFICE O/P EST HI 40 MIN: CPT | Mod: 25

## 2021-07-12 PROCEDURE — 94726 PLETHYSMOGRAPHY LUNG VOLUMES: CPT

## 2021-07-12 PROCEDURE — 36415 COLL VENOUS BLD VENIPUNCTURE: CPT

## 2021-07-12 PROCEDURE — ZZZZZ: CPT

## 2021-07-12 PROCEDURE — 94729 DIFFUSING CAPACITY: CPT

## 2021-07-12 PROCEDURE — 94010 BREATHING CAPACITY TEST: CPT

## 2021-07-12 NOTE — DISCUSSION/SUMMARY
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]-----RECEIVED BCG IN PAST ----PAST H/O QUANTIFERON TEST POSITIVE-----has thyroid nodule and a 7   mm ground glass opacity left lower lobe \par \par 1 h/o left l/l nodule ------PAST CT SCANS HAVE BEEN STABLE---f/u with  DR [facility]---   repeat CT in 1 year's time June 2022\par 2-left thyroid nodule----follow with endocrinology [ dr mott ]-\par 3-labs drawn in our office today\par 4- has features of herminia including oropharyngeal crowding-she has minimal sleep apnea on sleep study------- discussed CPAP but she is hesitant --her it EDS is only 5 we will follow her clinically\par 5- f/u jan 2022-\par \par Thanks for allowing  me to participate  in the care of this patient.  Patient at this time  will follow  the above mentioned recommendations and return back for follow up visit. If you have any questions  I can be reached  at #[phone] (beeper)  or  [phone] (office).\par \par LAURO SwannC\par \par Kristen Auguste MD, [facility] \par Director, Pulmonary Hypertension Program \par [facility] \par Division of Pulmonary, Critical Care and Sleep Medicine \par  Professor of Medicine \par [employer] [facility] [employer]\par \par \par GLEN Swann\par

## 2021-07-12 NOTE — HISTORY OF PRESENT ILLNESS
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]-----RECEIVED BCG IN PAST ----PAST H/O QUANTIFERON TEST POSITIVE-----has thyroid nodule and a 5 mm ground glass opacity left lower lobe \par Non-smoker\par \par ----Mild history of snoring  -----EDS 5 \par \par \par echo 10/2019 mild diastolic dysfunction stage I, moderate LVH\par CT chest 11/2019 stable\par \par she has no current resp complaints\par Smoking hx- lifelong non smoker\par Immuniz- up to date w/covid vac ([employer])\par \par CT CHEST 6/2020--- STABLE PUL  NODULES  , LEFT lower lobe 7X7X 12 MM nodular opacity repeat CT in 6 months time\par \par NOV 2020----   normal fever chills or rigors or weight loss\par \par 3/2021 no current pulm issues, recent ct chest 3/2021\par \par psg 4/2021 w/AILEEN 7- has not received cpap yet\par \par 7/2021 no current resp complaints----repeat CT in 1 year's time

## 2021-07-13 LAB
ALBUMIN SERPL ELPH-MCNC: 4.2 G/DL
ALP BLD-CCNC: 81 U/L
ALT SERPL-CCNC: 32 U/L
ANION GAP SERPL CALC-SCNC: 12 MMOL/L
AST SERPL-CCNC: 32 U/L
BILIRUB SERPL-MCNC: 0.5 MG/DL
BUN SERPL-MCNC: 18 MG/DL
CALCIUM SERPL-MCNC: 10.3 MG/DL
CHLORIDE SERPL-SCNC: 105 MMOL/L
CO2 SERPL-SCNC: 27 MMOL/L
CREAT SERPL-MCNC: 0.96 MG/DL
FERRITIN SERPL-MCNC: 247 NG/ML
GLUCOSE SERPL-MCNC: 109 MG/DL
POTASSIUM SERPL-SCNC: 4.7 MMOL/L
PROT SERPL-MCNC: 7.2 G/DL
SODIUM SERPL-SCNC: 144 MMOL/L

## 2021-10-28 ENCOUNTER — APPOINTMENT (OUTPATIENT)
Dept: PULMONOLOGY | Facility: CLINIC | Age: 71
End: 2021-10-28
Payer: MEDICARE

## 2021-10-28 VITALS
SYSTOLIC BLOOD PRESSURE: 179 MMHG | WEIGHT: 149 LBS | BODY MASS INDEX: 29.25 KG/M2 | DIASTOLIC BLOOD PRESSURE: 80 MMHG | HEART RATE: 76 BPM | OXYGEN SATURATION: 98 % | HEIGHT: 60 IN

## 2021-10-28 PROCEDURE — 99215 OFFICE O/P EST HI 40 MIN: CPT

## 2021-10-28 NOTE — DISCUSSION/SUMMARY
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]-----RECEIVED BCG IN PAST ----PAST H/O QUANTIFERON TEST POSITIVE-----has thyroid nodule and a 7   mm ground glass opacity left lower lobe \par \par 1 h/o left l/l nodule ------PAST CT SCANS HAVE BEEN STABLE---f/u with  DR [facility]---   repeat CT in 1 year's time June 2022\par 2-left thyroid nodule----follow with endocrinology [ dr mott ]-\par 3-labs drawn in our office today\par 4- VALORIE has features of valorie including oropharyngeal crowding-she has minimal sleep apnea on sleep study------ advised to be compliant to CPAP--advised to change headgear filters and mask every  3 to 6 months\par 5- f/u jan 2022-\par \par Thanks for allowing  me to participate  in the care of this patient.  Patient at this time  will follow  the above mentioned recommendations and return back for follow up visit. If you have any questions  I can be reached  at #[phone] (beeper)  or  [phone] (office).\par \par GLEN Swann\par \par Kristen Auguste MD, [facility] \par Director, Pulmonary Hypertension Program \par [facility] \par Division of Pulmonary, Critical Care and Sleep Medicine \par  Professor of Medicine \par [employer] [facility] [employer]\par \par \par GLEN Swann\par

## 2021-10-28 NOTE — HISTORY OF PRESENT ILLNESS
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]-----RECEIVED BCG IN PAST ----PAST H/O QUANTIFERON TEST POSITIVE-----has thyroid nodule and a 5 mm ground glass opacity left lower lobe \par Non-smoker\par \par ----Mild history of snoring  -----EDS 5 \par \par \par echo 10/2019 mild diastolic dysfunction stage I, moderate LVH\par CT chest 11/2019 stable\par \par she has no current resp complaints\par Smoking hx- lifelong non smoker\par Immuniz- up to date w/covid vac ([employer])\par \par CT CHEST 6/2020--- STABLE PUL  NODULES  , LEFT lower lobe 7X7X 12 MM nodular opacity repeat CT in 6 months time\par \par NOV 2020----   normal fever chills or rigors or weight loss\par \par 3/2021 no current pulm issues, recent ct chest 3/2021\par \par psg 4/2021 w/AILEEN 7- has not received cpap yet\par \par 7/2021 no current resp complaints----repeat CT in 1 year's time\par \par OCT 2021--------- on CPAP--- residual--EDS is minimal--advised to be compliant with machine-------- follow-up with Dr. Moody regarding lung nodule-----reviewed the CPAP study in great detail s

## 2022-01-10 ENCOUNTER — APPOINTMENT (OUTPATIENT)
Dept: PULMONOLOGY | Facility: CLINIC | Age: 72
End: 2022-01-10

## 2022-01-10 ENCOUNTER — APPOINTMENT (OUTPATIENT)
Dept: PULMONOLOGY | Facility: CLINIC | Age: 72
End: 2022-01-10
Payer: MEDICARE

## 2022-01-10 PROCEDURE — 99443: CPT

## 2022-01-10 NOTE — DISCUSSION/SUMMARY
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]-----RECEIVED BCG IN PAST ----PAST H/O QUANTIFERON TEST POSITIVE-----has thyroid nodule and a 7   mm ground glass opacity left lower lobe \par \par 1 h/o left l/l nodule ------PAST CT SCANS HAVE BEEN STABLE---f/u with  DR [facility]---   repeat CT in 1 year's time June 2022\par 2-left thyroid nodule----follow with endocrinology [ dr mott ]-\par 3- covid vac x 3 up to date\par 4- VALORIE  advised to be compliant to CPAP--advised to change headgear filters and mask every  3 to 6 months  ------on CPAP -- compliance report reviewed- pt is compliant to therapy w/average nightly usage 8 hrs 17min\par 5- f/u june n 2022-\par \par Thanks for allowing  me to participate  in the care of this patient.  Patient at this time  will follow  the above mentioned recommendations and return back for follow up visit. If you have any questions  I can be reached  at #[phone] (beeper)  or  [phone] (office).\par \par GLEN Swann\par \par Kristen Auguste MD, FCCP \par Director, Pulmonary Hypertension Program \par [facility] \par Division of Pulmonary, Critical Care and Sleep Medicine \par  Professor of Medicine \par [employer]\par \par \par GLEN Swann\par

## 2022-01-10 NOTE — HISTORY OF PRESENT ILLNESS
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]-----RECEIVED BCG IN PAST ----PAST H/O QUANTIFERON TEST POSITIVE-----has thyroid nodule and a 5 mm ground glass opacity left lower lobe \par Non-smoker\par \par ----Mild history of snoring  -----EDS 5 \par \par \par echo 10/2019 mild diastolic dysfunction stage I, moderate LVH\par CT chest 11/2019 stable\par \par she has no current resp complaints\par Smoking hx- lifelong non smoker\par Immuniz- up to date w/covid vac ([employer])\par \par CT CHEST 6/2020--- STABLE PUL  NODULES  , LEFT lower lobe 7X7X 12 MM nodular opacity repeat CT in 6 months time\par \par NOV 2020----   normal fever chills or rigors or weight loss\par \par 3/2021 no current pulm issues, recent ct chest 3/2021\par \par psg 4/2021 w/AILEEN 7- has not received cpap yet\par \par 7/2021 no current resp complaints----repeat CT in 1 year's time\par \par OCT 2021--------- on CPAP--- residual--EDS is minimal--advised to be compliant with machine-------- follow-up with Dr. Moody regarding lung nodule-----reviewed the CPAP study in great detail s\par \par \par 1/2022 herminia on nightly cpap and benefits from its use.  follow-ups with Dr. Moody regarding lung nodule--CT scan of the chest March April dysuria --on CPAP -

## 2022-06-09 ENCOUNTER — APPOINTMENT (OUTPATIENT)
Dept: PULMONOLOGY | Facility: CLINIC | Age: 72
End: 2022-06-09
Payer: MEDICARE

## 2022-06-09 VITALS
WEIGHT: 148 LBS | TEMPERATURE: 97.7 F | HEART RATE: 81 BPM | HEIGHT: 60 IN | SYSTOLIC BLOOD PRESSURE: 144 MMHG | DIASTOLIC BLOOD PRESSURE: 81 MMHG | BODY MASS INDEX: 29.06 KG/M2 | RESPIRATION RATE: 16 BRPM

## 2022-06-09 DIAGNOSIS — R05.9 COUGH, UNSPECIFIED: ICD-10-CM

## 2022-06-09 PROCEDURE — 99215 OFFICE O/P EST HI 40 MIN: CPT

## 2022-06-09 NOTE — HISTORY OF PRESENT ILLNESS
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]-----RECEIVED BCG IN PAST ----PAST H/O QUANTIFERON TEST POSITIVE-----has thyroid nodule and a 5 mm ground glass opacity left lower lobe \par Non-smoker\par \par ----Mild history of snoring  -----EDS 5 \par \par \par echo 10/2019 mild diastolic dysfunction stage I, moderate LVH\par CT chest 11/2019 stable\par \par she has no current resp complaints\par Smoking hx- lifelong non smoker\par Immuniz- up to date w/covid vac ([employer])\par \par CT CHEST 6/2020--- STABLE PUL  NODULES  , LEFT lower lobe 7X7X 12 MM nodular opacity repeat CT in 6 months time\par \par ct chest 2022-------0.6 cm ground glass appearance left lower lobe------CARDIOMEGALY \par \par NOV 2020----   normal fever chills or rigors or weight loss\par \par 3/2021 no current pulm issues, recent ct chest 3/2021\par \par psg 4/2021 w/AILEEN 7- has not received cpap yet\par \par 7/2021 no current resp complaints----repeat CT in 1 year's time\par \par OCT 2021--------- on CPAP--- residual--EDS is minimal--advised to be compliant with machine-------- follow-up with Dr. Moody regarding lung nodule-----reviewed the CPAP study in great detail s\par \par \par 1/2022 herminia on nightly cpap and benefits from its use.  follow-ups with Dr. Moody regarding lung nodule--CT scan of the chest March April dysuria --on CPAP -\par \par june 2022--------herminia on nightly cpap and benefits from its use.  follow---------------ct chest 2022-------0.6 cm ground glass appearance left lower lobe------CARDIOMEGALY--------------------ups with Dr. Moody regarding lung nodule--CT scan of the chest early next year   --on CPAP ----------

## 2022-06-09 NOTE — DISCUSSION/SUMMARY
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]-----RECEIVED BCG IN PAST ----PAST H/O QUANTIFERON TEST POSITIVE-----has thyroid nodule and a 7   mm ground glass opacity left lower lobe \par \par 1 h/o left l/l nodule ------PAST CT SCANS HAVE BEEN STABLE---f/u with  DR [facility]---   repeat CT in 1 year's time June 2023\par 2-left thyroid nodule----follow with endocrinology [ dr mott ]-\par 3- covid vac x 3 up to date\par 4- VALORIE  advised to be compliant to CPAP--advised to change headgear filters and mask every  3 to 6 months  ------on CPAP -- compliance report reviewed- pt is compliant to therapy w/average nightly usage 8 hrs 17min\par 5- f/u jOCT  2022-\par \par Thanks for allowing  me to participate  in the care of this patient.  Patient at this time  will follow  the above mentioned recommendations and return back for follow up visit. If you have any questions  I can be reached  at #[phone] (beeper)  or  [phone] (office).\par \par GLEN Swann\par \par Kristen Auguste MD, FCCP \par Director, Pulmonary Hypertension Program \par [facility] \par Division of Pulmonary, Critical Care and Sleep Medicine \par  Professor of Medicine \par [facility] [employer]\par \par \par GLEN Swann\par

## 2022-06-10 LAB
ALBUMIN SERPL ELPH-MCNC: 4.1 G/DL
ALP BLD-CCNC: 76 U/L
ALT SERPL-CCNC: 25 U/L
ANION GAP SERPL CALC-SCNC: 10 MMOL/L
AST SERPL-CCNC: 25 U/L
BASOPHILS # BLD AUTO: 0.02 K/UL
BASOPHILS NFR BLD AUTO: 0.5 %
BILIRUB SERPL-MCNC: 0.5 MG/DL
BUN SERPL-MCNC: 19 MG/DL
CALCIUM SERPL-MCNC: 9.8 MG/DL
CHLORIDE SERPL-SCNC: 106 MMOL/L
CO2 SERPL-SCNC: 28 MMOL/L
CREAT SERPL-MCNC: 0.99 MG/DL
EGFR: 61 ML/MIN/1.73M2
EOSINOPHIL # BLD AUTO: 0.18 K/UL
EOSINOPHIL NFR BLD AUTO: 4.9 %
GLUCOSE SERPL-MCNC: 150 MG/DL
HCT VFR BLD CALC: 42.7 %
HGB BLD-MCNC: 14.1 G/DL
IMM GRANULOCYTES NFR BLD AUTO: 0 %
LYMPHOCYTES # BLD AUTO: 1.55 K/UL
LYMPHOCYTES NFR BLD AUTO: 42.1 %
MAN DIFF?: NORMAL
MCHC RBC-ENTMCNC: 29.2 PG
MCHC RBC-ENTMCNC: 33 GM/DL
MCV RBC AUTO: 88.4 FL
MONOCYTES # BLD AUTO: 0.24 K/UL
MONOCYTES NFR BLD AUTO: 6.5 %
NEUTROPHILS # BLD AUTO: 1.69 K/UL
NEUTROPHILS NFR BLD AUTO: 46 %
PLATELET # BLD AUTO: 199 K/UL
POTASSIUM SERPL-SCNC: 4.1 MMOL/L
PROT SERPL-MCNC: 6.9 G/DL
RBC # BLD: 4.83 M/UL
RBC # FLD: 14.3 %
SODIUM SERPL-SCNC: 145 MMOL/L
WBC # FLD AUTO: 3.68 K/UL

## 2023-01-17 ENCOUNTER — APPOINTMENT (OUTPATIENT)
Dept: PULMONOLOGY | Facility: CLINIC | Age: 73
End: 2023-01-17
Payer: MEDICARE

## 2023-01-17 VITALS
HEIGHT: 60 IN | RESPIRATION RATE: 15 BRPM | TEMPERATURE: 97 F | SYSTOLIC BLOOD PRESSURE: 164 MMHG | HEART RATE: 81 BPM | DIASTOLIC BLOOD PRESSURE: 78 MMHG | OXYGEN SATURATION: 95 % | WEIGHT: 150 LBS | BODY MASS INDEX: 29.45 KG/M2

## 2023-01-17 PROCEDURE — 99215 OFFICE O/P EST HI 40 MIN: CPT

## 2023-01-17 NOTE — PHYSICAL EXAM
[No Acute Distress] : no acute distress [Normal Oropharynx] : normal oropharynx [III] : Mallampati Class: III [No Neck Mass] : no neck mass [Normal S1, S2] : normal s1, s2 [No Resp Distress] : no resp distress [No Abnormalities] : no abnormalities [Benign] : benign [Normal Gait] : normal gait [No Clubbing] : no clubbing [Normal Color/ Pigmentation] : normal color/ pigmentation [No Focal Deficits] : no focal deficits [Oriented x3] : oriented x3

## 2023-01-17 NOTE — DISCUSSION/SUMMARY
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]-----RECEIVED BCG IN PAST ----PAST H/O QUANTIFERON TEST POSITIVE-----has thyroid nodule and a 7   mm ground glass opacity left lower lobe \par \par 1 h/o left l/l nodule ------PAST CT SCANS HAVE BEEN STABLE---f/u with  DR [facility]--- \par 2-left thyroid nodule----follow with endocrinology [ dr mott ]-\par 3- covid vac x 3 up to date\par 4- VALORIE -----lately has been noncompliant to CPAP because she was getting dental work------- advised to be compliant to CPAP--advised to change headgear filters and mask every  3 to 6 months  -----\par 5- f/u sept 2023 -\par \par Thanks for allowing  me to participate  in the care of this patient.  Patient at this time  will follow  the above mentioned recommendations and return back for follow up visit. If you have any questions  I can be reached  at #[phone] (beeper)  or  [phone] (office).\par \par LAURO SwannC\par \par Kristen [facility] MD, [facility] \par Director, Pulmonary Hypertension Program \par [facility] \par Division of Pulmonary, Critical Care and Sleep Medicine \par  Professor of Medicine \par [employer]\par \par \par LAURO SwannC\[facility] [place]

## 2023-01-17 NOTE — HISTORY OF PRESENT ILLNESS
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]-----RECEIVED BCG IN PAST ----PAST H/O QUANTIFERON TEST POSITIVE-----has thyroid nodule and a 5 mm ground glass opacity left lower lobe \par Non-smoker\par \par ----Mild history of snoring  -----EDS 5 \par \par \par echo 10/2019 mild diastolic dysfunction stage I, moderate LVH\par CT chest 11/2019 stable\par \par she has no current resp complaints\par Smoking hx- lifelong non smoker\par Immuniz- up to date w/covid vac ([employer])\par \par CT CHEST 6/2020--- STABLE PUL  NODULES  , LEFT lower lobe 7X7X 12 MM nodular opacity repeat CT in 6 months time\par \par ct chest 2022-------0.6 cm ground glass appearance left lower lobe------CARDIOMEGALY \par \par NOV 2020----   normal fever chills or rigors or weight loss\par \par 3/2021 no current pulm issues, recent ct chest 3/2021\par \par psg 4/2021 w/AILEEN 7- has not received cpap yet\par \par 7/2021 no current resp complaints----repeat CT in 1 year's time\par \par OCT 2021--------- on CPAP--- residual--EDS is minimal--advised to be compliant with machine-------- follow-up with Dr. Moody regarding lung nodule-----reviewed the CPAP study in great detail s\par \par \par 1/2022 herminia on nightly cpap and benefits from its use.  follow-ups with Dr. Moody regarding lung nodule--CT scan of the chest March April dysuria --on CPAP -\par \par june 2022--------herminia on nightly cpap and benefits from its use.  follow---------------ct chest 2022-------0.6 cm ground glass appearance left lower lobe------CARDIOMEGALY--------------------ups with Dr. Moody regarding lung nodule--CT scan of the chest early next year   --on CPAP ----------\par \par JAN 2023-------H/O - herminia was  on nightly cpap ------------cpap compliance report reviewed- pt is 20% compliant-----pt reports lately had been noncompliant as she was having dental work-up done------ agrees to slowly start using the CPAP machine---low---------------ct chest 2022-------0.6 cm ground glass appearance left lower lobe------CARDIOMEGALY--------------------ups with Dr. Moody regarding lung nodule----- [TextBox_100] : 4/2021 [TextBox_108] : 7 [TextBox_112] : 4.3

## 2023-02-01 ENCOUNTER — APPOINTMENT (OUTPATIENT)
Dept: THORACIC SURGERY | Facility: CLINIC | Age: 73
End: 2023-02-01
Payer: MEDICARE

## 2023-02-01 ENCOUNTER — NON-APPOINTMENT (OUTPATIENT)
Age: 73
End: 2023-02-01

## 2023-02-01 VITALS
WEIGHT: 146 LBS | DIASTOLIC BLOOD PRESSURE: 77 MMHG | RESPIRATION RATE: 17 BRPM | HEART RATE: 73 BPM | OXYGEN SATURATION: 96 % | BODY MASS INDEX: 27.56 KG/M2 | SYSTOLIC BLOOD PRESSURE: 149 MMHG | HEIGHT: 61 IN

## 2023-02-01 PROCEDURE — 99214 OFFICE O/P EST MOD 30 MIN: CPT

## 2023-02-01 NOTE — PHYSICAL EXAM
[Respiration, Rhythm And Depth] : normal respiratory rhythm and effort [Exaggerated Use Of Accessory Muscles For Inspiration] : no accessory muscle use [Auscultation Breath Sounds / Voice Sounds] : lungs were clear to auscultation bilaterally [Heart Rate And Rhythm] : heart rate was normal and rhythm regular [Examination Of The Chest] : the chest was normal in appearance [Chest Visual Inspection Thoracic Asymmetry] : no chest asymmetry [Diminished Respiratory Excursion] : normal chest expansion [2+] : left 2+ [Cervical Lymph Nodes Enlarged Posterior Bilaterally] : posterior cervical [Cervical Lymph Nodes Enlarged Anterior Bilaterally] : anterior cervical [Supraclavicular Lymph Nodes Enlarged Bilaterally] : supraclavicular [Involuntary Movements] : no involuntary movements were seen [Skin Color & Pigmentation] : normal skin color and pigmentation [Skin Turgor] : normal skin turgor [] : no rash [No Focal Deficits] : no focal deficits [Oriented To Time, Place, And Person] : oriented to person, place, and time

## 2023-02-02 NOTE — CONSULT LETTER
[FreeTextEntry2] : Dr. Kristen Auguste (Pulm/Ref)  [FreeTextEntry3] : Misael Moody MD, FACS \par , Division of Thoracic Surgery \par [facility] \par Chief, Thoracic Surgery \par [facility] \par Department of Cardiovascular & Thoracic Surgery \par  \par [employer] [employer] [facility]\par

## 2023-02-02 NOTE — ASSESSMENT
[FreeTextEntry1] : Ms. DAREK VALENCIA, 72 year old female w/ PMHx of breast cancer (s/p left lumpectomy and radiation therapy (2002); Positive Quantiferon (has received BCG in the past) left thyroid nodule; hypertension; Type 2 diabetes. \par \par Initially presented to office in 2018 as a referral from Dr. Auguste for evaluation of LLL nodule. She is being followed with surveillance CT scans. \par \par I have independently reviewed the medical records and imaging at the time of this office consultation. CT Chest reviewed and compared to more remote studies. Left lower lobe nodule more prominent since 2018, but has not significantly enlarged. Discussed repeating scan in 12 months to re-evaluate its stability. She is agreeable. Return to clinic 1-2 weeks following repeat scan to discuss results as well as further plan of care. \par \par I, DIXON Beauchamp, personally performed the evaluation and management (E/M) services for this established patient. That E/M includes conducting the examination, assessing all new/exacerbated conditions, and establishing a new plan of care. Today, My ACP, Keila Calvillo, was here to observe my evaluation and management services for this patient to be followed going forward.\par \par

## 2023-02-02 NOTE — HISTORY OF PRESENT ILLNESS
[FreeTextEntry1] : Ms. DAREK VALENCIA, 72 year old female w/ PMHx of breast cancer (s/p left lumpectomy and radiation therapy (2002); Positive Quantiferon (has received BCG in the past) left thyroid nodule; hypertension; Type 2 diabetes. \par \par Initially presented to office in 2018 as a referral from Dr. Auguste for evaluation of LLL nodule. She is being followed with surveillance CT scans. \par \par CT Chest 2/08/2018: Persistent unchanged left lower lobe groundglass nodule. Left thyroid nodules have solid and cystic components. Bilateral axillary skin thickening greater on the left than the right with a nodular subcutaneous component on the left. \par \par Chest CT Scan 4/10/19: A 5 x 3 mm slightly irregular solid pulmonary nodule in the LLL (with tiny surrounding normal vessels) and a 3 x 2 mm ovoid subpleural solid nodule in the right lung apex posteriorly (series 7, image 44). Mild biapical pleural scaring. Enlarged thyroid gland with nodules. \par \par CT chest scan 6/8/2020: Stable LLL 7 x 7 x 12 mm subsegmental and nodular opacity is unchanged. RUL apical subpleural 4 mm nodule unchanged. \par \par CT Chest on 3/8/2021:\par - Stable LLL 7 x 6 mm gg nodule (5:206)\par - Enlarged thyroid and multinodular\par \par CT Chest on 12/08/2022:\par - There is no significant emphysematous change.  \par - Stable 8 mm groundglass nodule in the left lower lobe (series 5, image 207). \par - Linear atelectatic changes are seen in the posterior lower lungs.\par \par Patient is here today for follow up. Today, patient denies worsening SOB, chest pain, cough, hemoptysis, fever, chills, night sweats, lightheadedness or dizziness.\par \par \par

## 2023-04-13 NOTE — ASSESSMENT
[FreeTextEntry1] : 69 y/o female with history of a lung nodule returns today for followup. She was referred by Dr. Auguste in April 2018 for evaluation of a groundglass lung nodule. \par \par PMHx includes breast cancer s/p left lumpectomy and radiation therapy (2002). She also has received BCG in the past and has tested positive Quantiferon. She also has a left thyroid nodule, arthritis, hypertension, Type 2 diabetes mild VALORIE and migraine headaches. \par \par I have reviewed and compared patient scans dating back to February 2018 to June 2020. The LLL nodule appears stable and unchanged measuring 8mm in largest dimension. I have recommended the patient follow up in one year with a CT scan of the chest.\par  \par Written by Madhuri Trotter NP, acting as a scribe for Misael Jacobs MD.\par The documentation recorded by the scribe accurately reflects the service I personally performed and the decisions made by me. MISAEL JACOBS MD\par  
Female

## 2023-06-02 ENCOUNTER — APPOINTMENT (OUTPATIENT)
Dept: PULMONOLOGY | Facility: CLINIC | Age: 73
End: 2023-06-02
Payer: MEDICARE

## 2023-06-02 VITALS
HEIGHT: 61 IN | DIASTOLIC BLOOD PRESSURE: 72 MMHG | HEART RATE: 75 BPM | SYSTOLIC BLOOD PRESSURE: 146 MMHG | TEMPERATURE: 97 F | OXYGEN SATURATION: 97 % | WEIGHT: 150 LBS | BODY MASS INDEX: 28.32 KG/M2 | RESPIRATION RATE: 15 BRPM

## 2023-06-02 DIAGNOSIS — R93.89 ABNORMAL FINDINGS ON DIAGNOSTIC IMAGING OF OTHER SPECIFIED BODY STRUCTURES: ICD-10-CM

## 2023-06-02 DIAGNOSIS — G47.33 OBSTRUCTIVE SLEEP APNEA (ADULT) (PEDIATRIC): ICD-10-CM

## 2023-06-02 DIAGNOSIS — R09.82 POSTNASAL DRIP: ICD-10-CM

## 2023-06-02 PROCEDURE — 99215 OFFICE O/P EST HI 40 MIN: CPT

## 2023-06-02 NOTE — END OF VISIT
[Time Spent: ___ minutes] : I have spent [unfilled] minutes of time on the encounter. [FreeTextEntry3] : \par  I, Dr. Kristen Auguste  personally performed the evaluation and management (E/M) services for this established patient who presents today with (a) new problem(s)/exacerbation of (an) existing condition(s). That E/M includes conducting the clinically appropriate interval history &/or exam, assessing all new/exacerbated conditions, and establishing a new plan of care. Today, my SCAR, Kimberly Massey NP, , was here to observe my evaluation and management service for this new problem/exacerbated condition and follow the plan of care established by me going forward.\par

## 2023-06-02 NOTE — HISTORY OF PRESENT ILLNESS
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]-----RECEIVED BCG IN PAST ----PAST H/O QUANTIFERON TEST POSITIVE-----has thyroid nodule and a 5 mm ground glass opacity left lower lobe \par Non-smoker\par \par ----Mild history of snoring  -----EDS 5 \par \par \par echo 10/2019 mild diastolic dysfunction stage I, moderate LVH\par CT chest 11/2019 stable\par \par she has no current resp complaints\par Smoking hx- lifelong non smoker\par Immuniz- up to date w/covid vac ([employer])\par \par CT CHEST 6/2020--- STABLE PUL  NODULES  , LEFT lower lobe 7X7X 12 MM nodular opacity repeat CT in 6 months time\par \par ct chest 2022-------0.6 cm ground glass appearance left lower lobe------CARDIOMEGALY \par \par NOV 2020----   normal fever chills or rigors or weight loss\par \par 3/2021 no current pulm issues, recent ct chest 3/2021\par \par psg 4/2021 w/AILEEN 7- has not received cpap yet\par \par 7/2021 no current resp complaints----repeat CT in 1 year's time\par \par OCT 2021--------- on CPAP--- residual--EDS is minimal--advised to be compliant with machine-------- follow-up with Dr. Moody regarding lung nodule-----reviewed the CPAP study in great detail s\par \par \par 1/2022 herminia on nightly cpap and benefits from its use.  follow-ups with Dr. Moody regarding lung nodule--CT scan of the chest March April dysuria --on CPAP -\par \par june 2022--------herminia on nightly cpap and benefits from its use.  follow---------------ct chest 2022-------0.6 cm ground glass appearance left lower lobe------CARDIOMEGALY--------------------ups with Dr. Moody regarding lung nodule--CT scan of the chest early next year   --on CPAP ----------\par \par JAN 2023-------H/O - herminia was  on nightly cpap ------------cpap compliance report reviewed- pt is 20% compliant-----pt reports lately had been noncompliant as she was having dental work-up done------ agrees to slowly start using the CPAP machine---low---------------ct chest 2022-------0.6 cm ground glass appearance left lower lobe------CARDIOMEGALY--------------------ups with Dr. Moody regarding lung nodule-----\par \par 6/2023 herminia- on nightly cpap and benefits from its use- compliance report indicated that pt is 67% complaint\par \par c/o allergies/nasal congestion- uses flonase- allegra \par \par h/o abn CT chest- follows Dr Moody- no pulm complaints [TextBox_100] : 4/2021 [TextBox_108] : 7 [TextBox_112] : 4.3

## 2023-06-02 NOTE — DISCUSSION/SUMMARY
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]-----RECEIVED BCG IN PAST ----PAST H/O QUANTIFERON TEST POSITIVE-----has thyroid nodule and a 7   mm ground glass opacity left lower lobe \par \par 1 h/o left l/l nodule ------PAST CT SCANS HAVE BEEN STABLE---f/u with  [facility]--- next ct chest 2/2024\par 2-left thyroid nodule----follow with endocrinology [ dr mott ]-\par 3- covid vac x 3 up to date\par 4- VALORIE ----complaint to cpap and benefits from its use--advised to change headgear filters and mask every  3 to 6 months  -----\par 5- PND_ on flonase\par 6- f/u jan 2024 with pft\par \par Thanks for allowing  me to participate  in the care of this patient.  Patient at this time  will follow  the above mentioned recommendations and return back for follow up visit. If you have any questions  I can be reached  at # [phone] (office).\par \par \par Kristen Auguste MD, [facility] \par

## 2024-01-12 ENCOUNTER — APPOINTMENT (OUTPATIENT)
Dept: PULMONOLOGY | Facility: CLINIC | Age: 74
End: 2024-01-12
Payer: MEDICARE

## 2024-01-12 VITALS
OXYGEN SATURATION: 100 % | BODY MASS INDEX: 29.27 KG/M2 | HEART RATE: 85 BPM | WEIGHT: 155 LBS | HEIGHT: 61 IN | SYSTOLIC BLOOD PRESSURE: 143 MMHG | DIASTOLIC BLOOD PRESSURE: 77 MMHG

## 2024-01-12 PROCEDURE — 94729 DIFFUSING CAPACITY: CPT

## 2024-01-12 PROCEDURE — ZZZZZ: CPT

## 2024-01-12 PROCEDURE — 99215 OFFICE O/P EST HI 40 MIN: CPT | Mod: 25

## 2024-01-12 PROCEDURE — 94010 BREATHING CAPACITY TEST: CPT

## 2024-01-12 PROCEDURE — 94726 PLETHYSMOGRAPHY LUNG VOLUMES: CPT

## 2024-01-12 NOTE — HISTORY OF PRESENT ILLNESS
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]-----RECEIVED BCG IN PAST ----PAST H/O QUANTIFERON TEST POSITIVE-----has thyroid nodule and a 5 mm ground glass opacity left lower lobe  Non-smoker  ----Mild history of snoring  -----EDS 5    echo 10/2019 mild diastolic dysfunction stage I, moderate LVH CT chest 11/2019 stable  she has no current resp complaints Smoking hx- lifelong non smoker Immuniz- up to date w/covid vac ([employer])  CT CHEST 6/2020--- STABLE PUL  NODULES  , LEFT lower lobe 7X7X 12 MM nodular opacity repeat CT in 6 months time  ct chest 2022-------0.6 cm ground glass appearance left lower lobe------CARDIOMEGALY   NOV 2020----   normal fever chills or rigors or weight loss  3/2021 no current pulm issues, recent ct chest 3/2021  psg 4/2021 w/AILEEN 7- has not received cpap yet  7/2021 no current resp complaints----repeat CT in 1 year's time  OCT 2021--------- on CPAP--- residual--EDS is minimal--advised to be compliant with machine-------- follow-up with Dr. Moody regarding lung nodule-----reviewed the CPAP study in great detail s   1/2022 herminia on nightly cpap and benefits from its use.  follow-ups with Dr. Moody regarding lung nodule--CT scan of the chest March April dysuria --on CPAP -  june 2022--------herminia on nightly cpap and benefits from its use.  follow---------------ct chest 2022-------0.6 cm ground glass appearance left lower lobe------CARDIOMEGALY--------------------ups with Dr. Moody regarding lung nodule--CT scan of the chest early next year   --on CPAP ----------  JAN 2023-------H/O - herminia was  on nightly cpap ------------cpap compliance report reviewed- pt is 20% compliant-----pt reports lately had been noncompliant as she was having dental work-up done------ agrees to slowly start using the CPAP machine---low---------------ct chest 2022-------0.6 cm ground glass appearance left lower lobe------CARDIOMEGALY--------------------ups with Dr. Moody regarding lung nodule-----  6/2023 herminia- on nightly cpap and benefits from its use- compliance report indicated that pt is 67% complaint  c/o allergies/nasal congestion- uses flonase- allegra   h/o abn CT chest- follows Dr Moody- no pulm complaints  1/2024 ct chest stable lung nodules, left thyroid enlargement w/right tracheal deviation, hepatic cysts 1/2024 PFT w/restrictive defect and decreased dlco  1/2024 ct stable, no complaints follows endo for enlarged thyroid herminia- on nightly cpap and benefits from its use- compliance report indicates that pt is 100% compliant Pt was in [place] and was unable to use cpap d/t electricity issue- was advised that cpap would be taken away but [place]- [place] is using cpap nightly. given her HERMINIA- it is medically necessary for pt to continue cpa ptherapy  [TextBox_100] : 4/2021 [TextBox_108] : 7 [TextBox_112] : 4.3

## 2024-01-12 NOTE — END OF VISIT
[FreeTextEntry3] :   I, Dr. Kristen Auguste  personally performed the evaluation and management (E/M) services for this established patient who presents today with (a) new problem(s)/exacerbation of (an) existing condition(s). That E/M includes conducting the clinically appropriate interval history &/or exam, assessing all new/exacerbated conditions, and establishing a new plan of care. Today, my SCAR, Kimberly Massey NP, , was here to observe my evaluation and management service for this new problem/exacerbated condition and follow the plan of care established by me going forward. [Time Spent: ___ minutes] : I have spent [unfilled] minutes of time on the encounter.

## 2024-01-12 NOTE — DISCUSSION/SUMMARY
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 ---LEFT LUMPECTOMY AND S/P RT [2001]-----RECEIVED BCG IN PAST ----PAST H/O QUANTIFERON TEST POSITIVE-----has thyroid nodule and a 7   mm ground glass opacity left lower lobe   1 h/o left l/l nodule ------PAST CT SCANS HAVE BEEN STABLE---f/u with  [facility]--- next ct chest one year 2-left thyroid nodule----follow with endocrinology [ dr mott ]- 3- covid vac x 3 up to date 4- VALORIE -- on nightly cpap and benefits from its use- compliance report indicates that pt is 100% compliant Pt was in [facility] and was unable to use cpap d/t electricity issue- was advised that cpap would be taken away but [place]- [place] is using cpap nightly. given her VALORIE- it is medically necessary for pt to continue cpap  therapy (see attached compliance report) 5- PND_ on flonase 6- f/u in 6m  Thanks for allowing  me to participate  in the care of this patient.  Patient at this time  will follow  the above mentioned recommendations and return back for follow up visit. If you have any questions  I can be reached  at # [phone] (office).   Kristen Auguste MD, [facility]

## 2024-02-07 ENCOUNTER — NON-APPOINTMENT (OUTPATIENT)
Age: 74
End: 2024-02-07

## 2024-02-07 ENCOUNTER — APPOINTMENT (OUTPATIENT)
Dept: THORACIC SURGERY | Facility: CLINIC | Age: 74
End: 2024-02-07
Payer: MEDICARE

## 2024-02-07 VITALS
WEIGHT: 150 LBS | SYSTOLIC BLOOD PRESSURE: 166 MMHG | RESPIRATION RATE: 16 BRPM | OXYGEN SATURATION: 97 % | BODY MASS INDEX: 28.32 KG/M2 | HEART RATE: 72 BPM | DIASTOLIC BLOOD PRESSURE: 83 MMHG | HEIGHT: 61 IN

## 2024-02-07 DIAGNOSIS — R91.8 OTHER NONSPECIFIC ABNORMAL FINDING OF LUNG FIELD: ICD-10-CM

## 2024-02-07 DIAGNOSIS — R91.1 SOLITARY PULMONARY NODULE: ICD-10-CM

## 2024-02-07 PROCEDURE — 99213 OFFICE O/P EST LOW 20 MIN: CPT

## 2024-02-07 NOTE — HISTORY OF PRESENT ILLNESS
[FreeTextEntry1] : Ms. DAREK VALENCIA, 73 year old female w/ PMHx of breast cancer (s/p left lumpectomy and radiation therapy (2002); Positive Quantiferon (has received BCG in the past) left thyroid nodule; hypertension; Type 2 diabetes.   Initially presented to office in 2018 as a referral from Dr. Auguste for evaluation of LLL nodule. She is being followed with surveillance CT scans.   CT Chest on 12/08/2022: - There is no significant emphysematous change.   - Stable 8 mm groundglass nodule in the left lower lobe (series 5, image 207).  - Linear atelectatic changes are seen in the posterior lower lungs.  CT Chest on 01/09/2024 (R): - Mild dependent changes, minor bibasilar multifocal reticular scarring and subtle anterior left lung subpleural reticulation consistent with the sequela of old radiation pneumonitis.  - Chronically stable 6 mm groundglass nodule lateral basal segment left lower lobe (image 213) and 2 mm subpleural nodule posterior apical right upper lobe (image 46).  - Persistent rightward deviation of the trachea as it passes the asymmetrically enlarged left thyroid lobe.  - Minor biapical chronic pleural parenchymal scarring-pleural thickening. - Multiple hepatic cysts and cholecystectomy clips are reidentified. - Chronically stable left lumpectomy and adjuvant radiation therapy related changes.  PFTs on 01/12/2024: FVC 1.43, 57%; FEV1 1.17, 60%; DLCO 11.48, 60%  Patient is here today for 1 year follow up. Overall, she reports to be feeling well. Denies any chest pain, shortness of breath, cough, or hemoptysis.

## 2024-02-07 NOTE — CONSULT LETTER
[FreeTextEntry2] : Dr. Kristen Auguste (Pulm/Ref)  [FreeTextEntry3] : Misael Moody MD, FACS \par  , Division of Thoracic Surgery \par  [facility] \par  Chief, Thoracic Surgery \par  [facility] \par  Department of Cardiovascular & Thoracic Surgery \par   \par  [facility] [employer] [facility]\par

## 2024-02-07 NOTE — ASSESSMENT
[FreeTextEntry1] : Ms. DAREK VALENCIA, 73 year old female w/ PMHx of breast cancer (s/p left lumpectomy and radiation therapy (2002); Positive Quantiferon (has received BCG in the past) left thyroid nodule; hypertension; Type 2 diabetes.   Initially presented to office in 2018 as a referral from Dr. Auguste for evaluation of LLL nodule. She is being followed with surveillance CT scans.   She presents today for follow up with imaging.  I have independently reviewed the medical records and imaging at the time of this office consultation. CT Chest reviewed with patient, stable scan. I discussed she returns to clinic in 18 months with CT Chest without contrast. She is agreeable. - Advised continue follow up with pulm.   Recommendations reviewed with patient during this office visit, and all questions answered; Patient instructed on the importance of follow up and verbalizes understanding.    I, DIXON Beauchamp, personally performed the evaluation and management (E/M) services for this established patient. That E/M includes conducting the examination, assessing all new/exacerbated conditions, and establishing a new plan of care. Today, my ACP, Darian Beverly NP, was here to observe my evaluation and management services for this new problem/exacerbated condition to be followed going forward.

## 2024-02-07 NOTE — DATA REVIEWED
[FreeTextEntry1] : I have independently reviewed the following: CT Chest on 01/09/2024 PFTs on 01/12/2024

## 2024-08-08 ENCOUNTER — NON-APPOINTMENT (OUTPATIENT)
Age: 74
End: 2024-08-08

## 2024-08-08 ENCOUNTER — APPOINTMENT (OUTPATIENT)
Dept: PULMONOLOGY | Facility: CLINIC | Age: 74
End: 2024-08-08

## 2024-08-08 PROCEDURE — 99214 OFFICE O/P EST MOD 30 MIN: CPT

## 2024-08-08 NOTE — HISTORY OF PRESENT ILLNESS
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]-----RECEIVED BCG IN PAST ----PAST H/O QUANTIFERON TEST POSITIVE-----has thyroid nodule and a 5 mm ground glass opacity left lower lobe  Non-smoker  ----Mild history of snoring  -----EDS 5    echo 10/2019 mild diastolic dysfunction stage I, moderate LVH CT chest 11/2019 stable  she has no current resp complaints Smoking hx- lifelong non smoker Immuniz- up to date w/covid vac ([employer])  CT CHEST 6/2020--- STABLE PUL  NODULES  , LEFT lower lobe 7X7X 12 MM nodular opacity repeat CT in 6 months time  ct chest 2022-------0.6 cm ground glass appearance left lower lobe------CARDIOMEGALY    ct chest 2024----- 0.6 cm ground glass appearance left lower lobe------CARDIOMEGALY ---minimal basal reticulations--  NOV 2020----   normal fever chills or rigors or weight loss  3/2021 no current pulm issues, recent ct chest 3/2021  psg 4/2021 w/AILEEN 7- has not received cpap yet  7/2021 no current resp complaints----repeat CT in 1 year's time  OCT 2021--------- on CPAP--- residual--EDS is minimal--advised to be compliant with machine-------- follow-up with Dr. Moody regarding lung nodule-----reviewed the CPAP study in great detail s   1/2022 herminia on nightly cpap and benefits from its use.  follow-ups with Dr. Moody regarding lung nodule--CT scan of the chest March April dysuria --on CPAP -  june 2022--------herminia on nightly cpap and benefits from its use.  follow---------------ct chest 2022-------0.6 cm ground glass appearance left lower lobe------CARDIOMEGALY--------------------ups with Dr. Moody regarding lung nodule--CT scan of the chest early next year   --on CPAP ----------  JAN 2023-------H/O - herminia was  on nightly cpap ------------cpap compliance report reviewed- pt is 20% compliant-----pt reports lately had been noncompliant as she was having dental work-up done------ agrees to slowly start using the CPAP machine---low---------------ct chest 2022-------0.6 cm ground glass appearance left lower lobe------CARDIOMEGALY--------------------ups with Dr. Moody regarding lung nodule-----  6/2023 herminia- on nightly cpap and benefits from its use- compliance report indicated that pt is 67% complaint  c/o allergies/nasal congestion- uses flonase- allegra   h/o abn CT chest- follows Dr Moody- no pulm complaints  1/2024 ct chest stable lung nodules, left thyroid enlargement w/right tracheal deviation, hepatic cysts 1/2024 PFT w/restrictive defect and decreased dlco  1/2024 ct stable, no complaints follows endo for enlarged thyroid herminia- on nightly cpap and benefits from its use- compliance report indicates that pt is 100% compliant Pt was in [place] and was unable to use cpap d/t electricity issue- was advised that cpap would be taken away but [place]- [place] is [place] cpap nightly. given her HERMINIA- it is medically necessary for pt to continue cpa ptherapy  august 2024-----  ct stable, no complaints follows endo for enlarged thyroid herminia- on nightly cpap and benefits from its use- need compliance report  -- she is using cpap nightly. given her HERMINIA- it is medically necessary for pt to continue cpa p therapy --also ahs past h/o breast ca- stable- -- ct chest 2024----- 0.6 cm ground glass appearance left lower lobe------CARDIOMEGALY ---minimal basal reticulations--   [TextBox_100] : 4/2021 [TextBox_108] : 7 [TextBox_112] : 4.3

## 2024-08-08 NOTE — END OF VISIT
[FreeTextEntry3] :   I, Dr. Kristen Auguste  personally performed the evaluation and management (E/M) services for this established patient who presents today with (a) new problem(s)/exacerbation of (an) existing condition(s). That E/M includes conducting the clinically appropriate interval history &/or exam, assessing all new/exacerbated conditions, and establishing a new plan of care. Today, my SCAR, Kimberly Massey NP, , was here to observe my evaluation and management service for this new problem/exacerbated condition and follow the plan of care established by me going forward. [Time Spent: ___ minutes] : I have spent [unfilled] minutes of time on the encounter. Constitutional: Well developed, well nourished. NAD  TRAUMA: ABC intact. GCS 15.   Head: Normocephalic, atraumatic.  Eyes: PERRL. EOMI. No Raccoon eyes.   ENT: No nasal discharge. No septal hematoma. No Werner sign. Mucous membranes moist.  Neck: Supple. Painless ROM. No midline tenderness, stepoffs.  Cardiovascular: Normal S1, S2. Regular rate and rhythm. No murmurs, rubs, or gallops.  Pulmonary: Normal respiratory rate and effort. Lungs clear to auscultation bilaterally. No wheezing, rales, or rhonchi.  CHEST: No chest wall tenderness, crepitus.  Abdominal: Soft. Nondistended. Nontender. No rebound, guarding, rigidity.  BACK: No midline T/L/S tenderness, stepoffs. No saddle paresthesia.  Extremities. Pelvis stable. No traumatic deformities, tenderness of extremities.  Skin: No rashes, cyanosis, lacerations, abrasions.  Neuro: AAOx3. Strength 5/5 in all extremities. Sensation intact throughout. No focal neurological deficits.  Psych: Normal mood. Normal affect.

## 2024-08-08 NOTE — HISTORY OF PRESENT ILLNESS
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]-----RECEIVED BCG IN PAST ----PAST H/O QUANTIFERON TEST POSITIVE-----has thyroid nodule and a 5 mm ground glass opacity left lower lobe  Non-smoker  ----Mild history of snoring  -----EDS 5    echo 10/2019 mild diastolic dysfunction stage I, moderate LVH CT chest 11/2019 stable  she has no current resp complaints Smoking hx- lifelong non smoker Immuniz- up to date w/covid vac ([employer])  CT CHEST 6/2020--- STABLE PUL  NODULES  , LEFT lower lobe 7X7X 12 MM nodular opacity repeat CT in 6 months time  ct chest 2022-------0.6 cm ground glass appearance left lower lobe------CARDIOMEGALY    ct chest 2024----- 0.6 cm ground glass appearance left lower lobe------CARDIOMEGALY ---minimal basal reticulations--  NOV 2020----   normal fever chills or rigors or weight loss  3/2021 no current pulm issues, recent ct chest 3/2021  psg 4/2021 w/AILEEN 7- has not received cpap yet  7/2021 no current resp complaints----repeat CT in 1 year's time  OCT 2021--------- on CPAP--- residual--EDS is minimal--advised to be compliant with machine-------- follow-up with Dr. Moody regarding lung nodule-----reviewed the CPAP study in great detail s   1/2022 herminia on nightly cpap and benefits from its use.  follow-ups with Dr. Moody regarding lung nodule--CT scan of the chest March April dysuria --on CPAP -  june 2022--------herminia on nightly cpap and benefits from its use.  follow---------------ct chest 2022-------0.6 cm ground glass appearance left lower lobe------CARDIOMEGALY--------------------ups with Dr. Moody regarding lung nodule--CT scan of the chest early next year   --on CPAP ----------  JAN 2023-------H/O - herminia was  on nightly cpap ------------cpap compliance report reviewed- pt is 20% compliant-----pt reports lately had been noncompliant as she was having dental work-up done------ agrees to slowly start using the CPAP machine---low---------------ct chest 2022-------0.6 cm ground glass appearance left lower lobe------CARDIOMEGALY--------------------ups with Dr. Moody regarding lung nodule-----  6/2023 herminia- on nightly cpap and benefits from its use- compliance report indicated that pt is 67% complaint  c/o allergies/nasal congestion- uses flonase- allegra   h/o abn CT chest- follows Dr Moody- no pulm complaints  1/2024 ct chest stable lung nodules, left thyroid enlargement w/right tracheal deviation, hepatic cysts 1/2024 PFT w/restrictive defect and decreased dlco  1/2024 ct stable, no complaints follows endo for enlarged thyroid herminia- on nightly cpap and benefits from its use- compliance report indicates that pt is 100% compliant Pt was in [place] and was unable to use cpap d/t electricity issue- was advised that cpap would be taken away but since [place]- [place] is using cpap nightly. given her HERMINIA- it is medically necessary for pt to continue cpa ptherapy  august 2024-----  ct stable, no complaints follows endo for enlarged thyroid herminia- on nightly cpap and benefits from its use- need compliance report  -- she is using cpap nightly. given her HERMINIA- it is medically necessary for pt to continue cpa p therapy --also ahs past h/o breast ca- stable- -- ct chest 2024----- 0.6 cm ground glass appearance left lower lobe------CARDIOMEGALY ---minimal basal reticulations--   [TextBox_100] : 4/2021 [TextBox_108] : 7 [TextBox_112] : 4.3

## 2024-08-08 NOTE — DISCUSSION/SUMMARY
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 given her VALORIE- it is medically necessary for pt to continue cpap  therapy (see attached compliance report) 5- PND_ on flonase 6- f/u in 6m  Thanks for allowing  me to participate  in the care of this patient.  Patient at this time  will follow  the above mentioned recommendations and return back for follow up visit. If you have any questions  I can be reached  at # [phone] (office).   Kristen Auguste MD, [facility]

## 2024-08-08 NOTE — DISCUSSION/SUMMARY
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 given her VALORIE- it is medically necessary for pt to continue cpap  therapy (see attached compliance report) 5- PND_ on flonase 6- f/u in 6m  Thanks for allowing  me to participate  in the care of this patient.  Patient at this time  will follow  the above mentioned recommendations and return back for follow up visit. If you have any questions  I can be reached  at # [phone] (office).   Kristen Auguste MD, [facility]

## 2024-11-14 ENCOUNTER — NON-APPOINTMENT (OUTPATIENT)
Age: 74
End: 2024-11-14

## 2025-02-05 ENCOUNTER — NON-APPOINTMENT (OUTPATIENT)
Age: 75
End: 2025-02-05

## 2025-02-06 DIAGNOSIS — E04.9 NONTOXIC GOITER, UNSPECIFIED: ICD-10-CM

## 2025-02-06 DIAGNOSIS — I51.7 CARDIOMEGALY: ICD-10-CM

## 2025-02-14 ENCOUNTER — APPOINTMENT (OUTPATIENT)
Dept: PULMONOLOGY | Facility: CLINIC | Age: 75
End: 2025-02-14
Payer: MEDICARE

## 2025-02-14 VITALS
SYSTOLIC BLOOD PRESSURE: 176 MMHG | BODY MASS INDEX: 28.7 KG/M2 | HEIGHT: 61 IN | RESPIRATION RATE: 16 BRPM | HEART RATE: 74 BPM | WEIGHT: 152 LBS | DIASTOLIC BLOOD PRESSURE: 80 MMHG | OXYGEN SATURATION: 97 %

## 2025-02-14 VITALS — SYSTOLIC BLOOD PRESSURE: 150 MMHG | DIASTOLIC BLOOD PRESSURE: 82 MMHG

## 2025-02-14 PROCEDURE — 99214 OFFICE O/P EST MOD 30 MIN: CPT

## 2025-02-19 ENCOUNTER — APPOINTMENT (OUTPATIENT)
Dept: CARDIOLOGY | Facility: CLINIC | Age: 75
End: 2025-02-19
Payer: MEDICARE

## 2025-02-19 PROCEDURE — 93306 TTE W/DOPPLER COMPLETE: CPT

## 2025-05-12 ENCOUNTER — NON-APPOINTMENT (OUTPATIENT)
Age: 75
End: 2025-05-12

## 2025-08-27 ENCOUNTER — NON-APPOINTMENT (OUTPATIENT)
Age: 75
End: 2025-08-27

## 2025-09-02 ENCOUNTER — APPOINTMENT (OUTPATIENT)
Dept: PULMONOLOGY | Facility: CLINIC | Age: 75
End: 2025-09-02
Payer: MEDICARE

## 2025-09-02 VITALS
TEMPERATURE: 97.7 F | DIASTOLIC BLOOD PRESSURE: 75 MMHG | BODY MASS INDEX: 28.32 KG/M2 | OXYGEN SATURATION: 95 % | HEART RATE: 67 BPM | SYSTOLIC BLOOD PRESSURE: 151 MMHG | HEIGHT: 61 IN | WEIGHT: 150 LBS

## 2025-09-02 PROCEDURE — 99214 OFFICE O/P EST MOD 30 MIN: CPT

## 2025-09-02 RX ORDER — FLUTICASONE PROPIONATE 50 UG/1
50 SPRAY NASAL DAILY
Qty: 3 | Refills: 3 | Status: ACTIVE | COMMUNITY
Start: 2025-09-02 | End: 1900-01-01